# Patient Record
Sex: FEMALE | Race: WHITE | NOT HISPANIC OR LATINO | Employment: OTHER | URBAN - METROPOLITAN AREA
[De-identification: names, ages, dates, MRNs, and addresses within clinical notes are randomized per-mention and may not be internally consistent; named-entity substitution may affect disease eponyms.]

---

## 2017-08-08 ENCOUNTER — ALLSCRIPTS OFFICE VISIT (OUTPATIENT)
Dept: OTHER | Facility: OTHER | Age: 52
End: 2017-08-08

## 2017-08-08 DIAGNOSIS — Z12.31 ENCOUNTER FOR SCREENING MAMMOGRAM FOR MALIGNANT NEOPLASM OF BREAST: ICD-10-CM

## 2018-01-12 VITALS
HEIGHT: 63 IN | SYSTOLIC BLOOD PRESSURE: 110 MMHG | WEIGHT: 142 LBS | BODY MASS INDEX: 25.16 KG/M2 | DIASTOLIC BLOOD PRESSURE: 64 MMHG

## 2018-10-18 ENCOUNTER — ANNUAL EXAM (OUTPATIENT)
Dept: OBGYN CLINIC | Facility: CLINIC | Age: 53
End: 2018-10-18
Payer: COMMERCIAL

## 2018-10-18 VITALS
BODY MASS INDEX: 21.82 KG/M2 | WEIGHT: 139 LBS | HEIGHT: 67 IN | DIASTOLIC BLOOD PRESSURE: 74 MMHG | SYSTOLIC BLOOD PRESSURE: 116 MMHG

## 2018-10-18 DIAGNOSIS — Z01.419 ENCOUNTER FOR GYNECOLOGICAL EXAMINATION WITHOUT ABNORMAL FINDING: Primary | ICD-10-CM

## 2018-10-18 DIAGNOSIS — Z12.4 ENCOUNTER FOR PAPANICOLAOU SMEAR FOR CERVICAL CANCER SCREENING: ICD-10-CM

## 2018-10-18 DIAGNOSIS — Z12.12 SCREENING FOR COLORECTAL CANCER: ICD-10-CM

## 2018-10-18 DIAGNOSIS — Z12.11 SCREENING FOR COLORECTAL CANCER: ICD-10-CM

## 2018-10-18 DIAGNOSIS — Z12.31 ENCOUNTER FOR SCREENING MAMMOGRAM FOR MALIGNANT NEOPLASM OF BREAST: ICD-10-CM

## 2018-10-18 PROCEDURE — S0612 ANNUAL GYNECOLOGICAL EXAMINA: HCPCS | Performed by: NURSE PRACTITIONER

## 2018-10-18 RX ORDER — EZETIMIBE 10 MG/1
10 TABLET ORAL
COMMUNITY

## 2018-10-18 RX ORDER — DIPHENOXYLATE HYDROCHLORIDE AND ATROPINE SULFATE 2.5; .025 MG/1; MG/1
1 TABLET ORAL DAILY
COMMUNITY

## 2018-10-18 RX ORDER — ATORVASTATIN CALCIUM 40 MG/1
TABLET, FILM COATED ORAL
COMMUNITY
Start: 2015-06-22

## 2018-10-18 RX ORDER — ATORVASTATIN CALCIUM 40 MG/1
40 TABLET, FILM COATED ORAL DAILY
Refills: 0 | COMMUNITY
Start: 2018-10-04 | End: 2018-10-18

## 2018-10-18 RX ORDER — LORATADINE 10 MG/1
10 TABLET ORAL
COMMUNITY
End: 2019-05-30

## 2018-10-18 RX ORDER — FLUTICASONE PROPIONATE 50 MCG
2 SPRAY, SUSPENSION (ML) NASAL
COMMUNITY
End: 2019-08-27

## 2018-10-18 NOTE — PROGRESS NOTES
Assessment / Plan    1  Encounter for gynecological examination without abnormal finding  Normal well woman exam   Pap with hpv obtained  Counseled about risk of e-cig nicotine & marijuana use; not interested in quitting  2  Encounter for Papanicolaou smear for cervical cancer screening    3  Encounter for screening mammogram for malignant neoplasm of breast    - Mammo diagnostic bilateral w 3d & cad; Future    4  Screening for colorectal cancer  Referral provided    - Ambulatory referral to Gastroenterology; Future        Subjective      Ilya Luis is a 47 y o  female who presents for her annual gynecologic exam     Doing well, no complaints  Last pap:  negative  Last mammogram: 2015 [facility] mammo, benign  Colonoscopy- not yet    Quit cigarette use; now regularly smokes e-cigs  Not interested in quitting  Uses marijuana for anxiety  Periods: none; supracervical hysterectomy  Current contraception: status post hysterectomy  History of abnormal Pap smear: yes - in her 25s  Family history of breast,uterine, ovarian or colon cancer: no    Menstrual History:  OB History      Para Term  AB Living    3 1     2 1    SAB TAB Ectopic Multiple Live Births    1 1               Menarche age: 16  No LMP recorded  Patient has had a hysterectomy  The following portions of the patient's history were reviewed and updated as appropriate: allergies, current medications, past family history, past medical history, past social history, past surgical history and problem list     Review of Systems      Review of Systems   Constitutional: Negative for chills and fever  Gastrointestinal: Negative for abdominal distention, abdominal pain, blood in stool, constipation, diarrhea, nausea and vomiting  Genitourinary: Negative for difficulty urinating, dysuria, frequency, genital sores, hematuria, menstrual problem, pelvic pain, urgency, vaginal bleeding and vaginal discharge       Breasts:  Negative for skin changes, dimpling, asymmetry, nipple discharge, redness, tenderness or palpable masses      Objective      /74   Ht 5' 7" (1 702 m)   Wt 63 kg (139 lb)   BMI 21 77 kg/m²      Physical Exam   Constitutional: She appears well-developed and well-nourished  No distress  Neck: Neck supple  No thyromegaly present  Pulmonary/Chest: Right breast exhibits no inverted nipple, no mass, no nipple discharge, no skin change and no tenderness  Left breast exhibits no inverted nipple, no mass, no nipple discharge, no skin change and no tenderness  Breasts are symmetrical    Abdominal: Soft  Normal appearance  She exhibits no mass  There is no tenderness  There is no CVA tenderness  Genitourinary: Rectum normal  No labial fusion  There is no rash, tenderness, lesion or injury on the right labia  There is no rash, tenderness, lesion or injury on the left labia  Cervix exhibits no motion tenderness, no discharge and no friability  Right adnexum displays no mass, no tenderness and no fullness  Left adnexum displays no mass, no tenderness and no fullness  No erythema, tenderness or bleeding in the vagina  No foreign body in the vagina  No signs of injury around the vagina  No vaginal discharge found  Genitourinary Comments: Uterus surgically absent   Lymphadenopathy:     She has no cervical adenopathy  She has no axillary adenopathy  Right: No inguinal and no supraclavicular adenopathy present  Left: No inguinal and no supraclavicular adenopathy present  Neurological: She is alert  She is not disoriented  Skin: Skin is warm, dry and intact  Psychiatric: She has a normal mood and affect   Her behavior is normal

## 2018-10-23 LAB
CLINICAL INFO: ABNORMAL
CYTO CVX: ABNORMAL
DATE PREVIOUS BX: ABNORMAL
GEN CATEG CVX/VAG CYTO-IMP: ABNORMAL
HPV E6+E7 MRNA CVX QL NAA+PROBE: DETECTED
LMP START DATE: ABNORMAL
SL AMB PREV. PAP:: ABNORMAL
SPECIMEN SOURCE CVX/VAG CYTO: ABNORMAL

## 2018-10-24 ENCOUNTER — TELEPHONE (OUTPATIENT)
Dept: OBGYN CLINIC | Facility: CLINIC | Age: 53
End: 2018-10-24

## 2018-10-24 NOTE — TELEPHONE ENCOUNTER
Spoke to patient regarding her pap smear result which showed LGSIL  Explained nature of LGSIL and need for further evaluation of her cervix with colposcopy  Patient expressed understanding and she will call to book appointment

## 2018-10-30 ENCOUNTER — TELEPHONE (OUTPATIENT)
Dept: GASTROENTEROLOGY | Facility: AMBULARY SURGERY CENTER | Age: 53
End: 2018-10-30

## 2018-10-30 ENCOUNTER — PREP FOR PROCEDURE (OUTPATIENT)
Dept: GASTROENTEROLOGY | Facility: MEDICAL CENTER | Age: 53
End: 2018-10-30

## 2018-10-30 DIAGNOSIS — Z12.11 ENCOUNTER FOR SCREENING COLONOSCOPY: Primary | ICD-10-CM

## 2018-10-30 NOTE — TELEPHONE ENCOUNTER
Jonathan Richter  1965  [address] [facility] [address]  [phone]  Cell Phone     Screened by: Lori Quick  ]    Referring Dr :     Pre- Screening:   Has patient been referred for a routine screening Colonoscopy? yes  Is the patient over 48years of age? yes    If the answer is YES to both questions, proceed to the medical questions  Do you have any of the following symptoms? Have you had a coronary or vascular stent within the last year? no    Have you had a heart attack or stroke in the last 6 months? no    Have you had intestinal surgery in the last 3 months? no    Do you have problems with:    Do you use:  Oxygen no  CPAP/BiPAP no    Have you been hospitalized in the last Month? no    Have you been diagnosed with a bleeding disorder or anemia? no    Have you had chest pain (angina) or breathing problems  (COPD) in the last 3 months? no     Do you have any difficulty walking up a flight of stairs? no    Have you had Kidney failure or insufficiency? no    Have you had heart valve surgery? no    Are you confined to a wheelchair? no    Do you take     Do you take insulin for Diabetes no  Name of medication:    : If patient answers NO to medical questions, then schedule procedure  If patient answers YES to medical questions, then schedule office appointment  Previous Colonoscopy no   (if yes) Date and Facility of last colonoscopy? Patient scheduled for procedure:   Scheduled by:     Time:   Provider:   Location:     Insurance:   Referral Required? Were instructions Mailed? [facility]:   Was the prep sent to Pharmacy?      Comments: [ [place] ]

## 2018-10-30 NOTE — TELEPHONE ENCOUNTER
Pt is scheduled with dr Btei Lee for oa colon on 11/30/18, I went over miralax prep with pt and mailed out to pt home   Pt is aware she will get a call the day before with exact time of arrival

## 2018-10-30 NOTE — PATIENT INSTRUCTIONS
Pt is scheduled with dr Marifer Geiger for oa colon on 11/30/18, I went over miralax prep with pt and mailed out to pt home   Pt is aware she will get a call the day before with exact time of arrival

## 2018-11-01 ENCOUNTER — HOSPITAL ENCOUNTER (OUTPATIENT)
Dept: MAMMOGRAPHY | Facility: CLINIC | Age: 53
Discharge: HOME | End: 2018-11-01
Payer: COMMERCIAL

## 2018-11-01 DIAGNOSIS — Z12.31 ENCOUNTER FOR SCREENING MAMMOGRAM FOR MALIGNANT NEOPLASM OF BREAST: ICD-10-CM

## 2018-11-01 PROCEDURE — 77063 BREAST TOMOSYNTHESIS BI: CPT

## 2018-11-01 PROCEDURE — 77067 SCR MAMMO BI INCL CAD: CPT

## 2018-11-01 NOTE — LETTER
[address] [place] [address], [address] [place] [id number]      January 2, 2020    MRN: [id number]     Phone:[phone]     Dear Ms  Tres,    Based on your breast imaging exam performed on November 1, 2018 it is time to schedule your next screening breast imaging exam  Our records indicate that this examination has not yet been performed  Please contact your physician's office to obtain a prescription or referral for this exam     Screening mammography for early detection of cancer is important for your ongoing health  If you feel a lump or have any other reasons for concern, you should tell your health care provider  Early detection requires a combination of monthly breast self-awareness, yearly clinical breast examinations and periodic mammography according to your age, risk and physician recommendations  Yvonne  Scheduling department can assist you in making your next appointment by calling [phone]  Thank you for choosing [facility] for your imaging needs      Sincerely,    [address] [place] [address]

## 2018-11-20 ENCOUNTER — PROCEDURE VISIT (OUTPATIENT)
Dept: OBGYN CLINIC | Facility: CLINIC | Age: 53
End: 2018-11-20
Payer: COMMERCIAL

## 2018-11-20 VITALS
BODY MASS INDEX: 22.12 KG/M2 | SYSTOLIC BLOOD PRESSURE: 128 MMHG | DIASTOLIC BLOOD PRESSURE: 68 MMHG | WEIGHT: 141.2 LBS | HEART RATE: 63 BPM

## 2018-11-20 DIAGNOSIS — B97.7 HIGH RISK HPV INFECTION: ICD-10-CM

## 2018-11-20 DIAGNOSIS — R87.612 LGSIL ON PAP SMEAR OF CERVIX: Primary | ICD-10-CM

## 2018-11-20 PROCEDURE — 57454 BX/CURETT OF CERVIX W/SCOPE: CPT | Performed by: OBSTETRICS & GYNECOLOGY

## 2018-11-20 NOTE — PROGRESS NOTES
Colposcopy  Date/Time: 11/20/2018 9:24 AM  Performed by: Bj Noonan by: Clinton Soriano     Consent:     Consent obtained:  Written    Consent given by:  Patient    Procedural risks discussed:  Bleeding, failure rate, infection and possible continued pain    Patient questions answered: yes      Patient agrees, verbalizes understanding, and wants to proceed: yes      Educational handouts given: yes      Instructions and paperwork completed: yes    Pre-procedure:     Pre-procedure timeout performed: yes      Prepped with: acetic acid    Indication:     Indication:  LSIL (+HPV)  Procedure:     Procedure: Colposcopy w/ cervical biopsy and ECC      Under satisfactory analgesia the patient was prepped and draped in the dorsal lithotomy position: yes      Blanca speculum was placed in the vagina: yes      Under colposcopic examination the transition zone was seen in entirety: yes      Endocervix was curetted using a Kevorkian curette: yes      Cervical biopsy performed with a cervical biopsy punch: yes      Monsel's solution was applied: yes      Biopsy(s): yes      Location:  6 oclock, 9 oclock    Specimen to pathology: yes    Post-procedure:     Findings: Mosaicism and White epithelium      Impression: Low grade cervical dysplasia      Patient tolerance of procedure:   Tolerated well, no immediate complications  Comments:      Mosaicism 6 o clock, aceto white 9 oclock

## 2018-11-21 LAB
CLINICAL INFO: NORMAL
PATH REPORT.FINAL DX SPEC: NORMAL
PATH REPORT.FINAL DX SPEC: NORMAL
PROCEDURE TYPE: NORMAL
SPECIMEN SOURCE: NORMAL

## 2018-11-29 ENCOUNTER — ANESTHESIA EVENT (OUTPATIENT)
Dept: GASTROENTEROLOGY | Facility: MEDICAL CENTER | Age: 53
End: 2018-11-29
Payer: COMMERCIAL

## 2018-11-30 ENCOUNTER — HOSPITAL ENCOUNTER (OUTPATIENT)
Facility: MEDICAL CENTER | Age: 53
Setting detail: OUTPATIENT SURGERY
Discharge: HOME | End: 2018-11-30
Attending: INTERNAL MEDICINE | Admitting: INTERNAL MEDICINE
Payer: COMMERCIAL

## 2018-11-30 ENCOUNTER — ANESTHESIA (OUTPATIENT)
Dept: GASTROENTEROLOGY | Facility: MEDICAL CENTER | Age: 53
End: 2018-11-30
Payer: COMMERCIAL

## 2018-11-30 ENCOUNTER — TELEPHONE (OUTPATIENT)
Dept: OBGYN CLINIC | Facility: CLINIC | Age: 53
End: 2018-11-30

## 2018-11-30 VITALS
HEIGHT: 67 IN | HEART RATE: 61 BPM | WEIGHT: 141 LBS | DIASTOLIC BLOOD PRESSURE: 69 MMHG | OXYGEN SATURATION: 98 % | BODY MASS INDEX: 22.13 KG/M2 | TEMPERATURE: 97.8 F | SYSTOLIC BLOOD PRESSURE: 112 MMHG | RESPIRATION RATE: 16 BRPM

## 2018-11-30 DIAGNOSIS — Z12.11 ENCOUNTER FOR SCREENING COLONOSCOPY: ICD-10-CM

## 2018-11-30 PROCEDURE — 45385 COLONOSCOPY W/LESION REMOVAL: CPT | Performed by: INTERNAL MEDICINE

## 2018-11-30 PROCEDURE — 45380 COLONOSCOPY AND BIOPSY: CPT | Performed by: INTERNAL MEDICINE

## 2018-11-30 PROCEDURE — 88305 TISSUE EXAM BY PATHOLOGIST: CPT | Performed by: PATHOLOGY

## 2018-11-30 RX ORDER — PROPOFOL 10 MG/ML
INJECTION, EMULSION INTRAVENOUS AS NEEDED
Status: DISCONTINUED | OUTPATIENT
Start: 2018-11-30 | End: 2018-11-30 | Stop reason: SURG

## 2018-11-30 RX ORDER — SODIUM CHLORIDE 9 MG/ML
125 INJECTION, SOLUTION INTRAVENOUS CONTINUOUS
Status: DISCONTINUED | OUTPATIENT
Start: 2018-11-30 | End: 2018-11-30 | Stop reason: HOSPADM

## 2018-11-30 RX ADMIN — PROPOFOL 50 MG: 10 INJECTION, EMULSION INTRAVENOUS at 12:46

## 2018-11-30 RX ADMIN — PROPOFOL 50 MG: 10 INJECTION, EMULSION INTRAVENOUS at 12:43

## 2018-11-30 RX ADMIN — SODIUM CHLORIDE 125 ML/HR: 0.9 INJECTION, SOLUTION INTRAVENOUS at 12:32

## 2018-11-30 RX ADMIN — PROPOFOL 50 MG: 10 INJECTION, EMULSION INTRAVENOUS at 12:53

## 2018-11-30 RX ADMIN — PROPOFOL 100 MG: 10 INJECTION, EMULSION INTRAVENOUS at 12:38

## 2018-11-30 RX ADMIN — PROPOFOL 50 MG: 10 INJECTION, EMULSION INTRAVENOUS at 12:40

## 2018-11-30 NOTE — DISCHARGE INSTRUCTIONS
Colonoscopy   WHAT YOU NEED TO KNOW:   A colonoscopy is a procedure to examine the inside of your colon (intestine) with a scope  Polyps or tissue growths may have been removed during your colonoscopy  It is normal to feel bloated and to have some abdominal discomfort  You should be passing gas  If you have hemorrhoids or you had polyps removed, you may have a small amount of bleeding  DISCHARGE INSTRUCTIONS:   Seek care immediately if:   · You have a large amount of bright red blood in your bowel movements  · Your abdomen is hard and firm and you have severe pain  · You have sudden trouble breathing  Contact your healthcare provider if:   · You develop a rash or hives  · You have a fever within 24 hours of your procedure  · You have not had a bowel movement for 3 days after your procedure  · You have questions or concerns about your condition or care  Activity:   · Do not lift, strain, or run  for 3 days after your procedure  · Rest after your procedure  You have been given medicine to relax you  Do not  drive or make important decisions until the day after your procedure  Return to your normal activity as directed  · Relieve gas and discomfort from bloating  by lying on your right side with a heating pad on your abdomen  You may need to take short walks to help the gas move out  Eat small meals until bloating is relieved  If you had polyps removed: For 7 days after your procedure:  · Do not  take aspirin  · Do not  go on long car rides  Help prevent constipation:   · Eat a variety of healthy foods  Healthy foods include fruit, vegetables, whole-grain breads, low-fat dairy products, beans, lean meat, and fish  Ask if you need to be on a special diet  Your healthcare provider may recommend that you eat high-fiber foods such as cooked beans  Fiber helps you have regular bowel movements  · Drink liquids as directed    Adults should drink between 9 and 13 eight-ounce cups of liquid every day  Ask what amount is best for you  For most people, good liquids to drink are water, juice, and milk  · Exercise as directed  Talk to your healthcare provider about the best exercise plan for you  Exercise can help prevent constipation, decrease your blood pressure and improve your health  Follow up with your healthcare provider as directed:  Write down your questions so you remember to ask them during your visits  © 2017 [address] [employer] Information is for End User's use only and may not be sold, redistributed or otherwise used for commercial purposes  All illustrations and images included in CareNotes® are the copyrighted property of A D A M , Inc  or Rafael Castillo  The above information is an  only  It is not intended as medical advice for individual conditions or treatments  Talk to your doctor, nurse or pharmacist before following any medical regimen to see if it is safe and effective for you  Colorectal Polyps   WHAT YOU NEED TO KNOW:   Colorectal polyps are small growths of tissue in the lining of the colon and rectum  Most polyps are hyperplastic polyps and are usually benign (noncancerous)  Certain types of polyps, called adenomatous polyps, may turn into cancer  DISCHARGE INSTRUCTIONS:   Follow up with your healthcare provider or gastroenterologist as directed: You may need to return for more tests, such as another colonoscopy  Write down your questions so you remember to ask them during your visits  Reduce your risk for colorectal polyps:   · Eat a variety of healthy foods:  Healthy foods include fruit, vegetables, whole-grain breads, low-fat dairy products, beans, lean meat, and fish  Ask if you need to be on a special diet  · Maintain a healthy weight:  Ask your healthcare provider if you need to lose weight and how much you need to lose   Ask for help with a weight loss program     · Exercise:  Begin to exercise slowly and do more as you get stronger  Talk with your healthcare provider before you start an exercise program      · Limit alcohol:  Your risk for polyps increases the more you drink  · Do not smoke: If you smoke, it is never too late to quit  Ask for information about how to stop  For support and more information:   · Candido Torres ([facility])  [address] , [place] [address]  Phone: 6- 228 - 819-3757  Web Address: www digestive  niddk nih gov  Contact your healthcare provider or gastroenterologist if:   · You have a fever  · You have chills, a cough, or feel weak and achy  · You have abdominal pain that does not go away or gets worse after you take medicine  · Your abdomen is swollen  · You are losing weight without trying  · You have questions or concerns about your condition or care  Seek care immediately or call 911 if:   · You have sudden shortness of breath  · You have a fast heart rate, fast breathing, or are too dizzy to stand up  · You have severe abdominal pain  · You see blood in your bowel movement  © 2017 [address] [facility] Information is for End User's use only and may not be sold, redistributed or otherwise used for commercial purposes  All illustrations and images included in CareNotes® are the copyrighted property of A D A M , Inc  or Rafael Castillo  The above information is an  only  It is not intended as medical advice for individual conditions or treatments  Talk to your doctor, nurse or pharmacist before following any medical regimen to see if it is safe and effective for you  Hemorrhoids   WHAT YOU NEED TO KNOW:   What are hemorrhoids? Hemorrhoids are swollen blood vessels inside your rectum (internal hemorrhoids) or on your anus (external hemorrhoids)  Sometimes a hemorrhoid may prolapse  This means it extends out of your anus  What increases my risk for hemorrhoids? · Pregnancy or obesity    · Straining or sitting for a long time during bowel movements    · Liver disease    · Weak muscles around the anus caused by older age, rectal surgery, or anal intercourse    · A lack of physical activity    · Chronic diarrhea or constipation    · A low-fiber diet  What are the signs and symptoms of hemorrhoids? · Pain or itching around your anus or inside your rectum    · Swelling or bumps around your anus    · Bright red blood in your bowel movement, on the toilet paper, or in the toilet bowl    · Tissue bulging out of your anus (prolapsed hemorrhoids)    · Incontinence (poor control over urine or bowel movements)  How are hemorrhoids diagnosed? Your healthcare provider will ask about your symptoms, the foods you eat, and your bowel movements  He will examine your anus for external hemorrhoids  You may need the following:  · A digital rectal exam  is a test to check for hemorrhoids  Your healthcare provider will put a gloved finger inside your anus to feel for the hemorrhoids  · An anoscopy  is a test that uses a scope (small tube with a light and camera on the end) to look at your hemorrhoids  How are hemorrhoids treated? Treatment will depend on your symptoms  You may need any of the following:  · Medicines  can help decrease pain and swelling, and soften your bowel movement  The medicine may be a pill, pad, cream, or ointment  · Procedures  may be used to shrink or remove your hemorrhoid  Examples include rubber-band ligation, sclerotherapy, and photocoagulation  These procedures may be done in your healthcare provider's office  Ask your healthcare provider for more information about these procedures  · Surgery  may be needed to shrink or remove your hemorrhoids  How can I manage my symptoms? · Apply ice on your anus for 15 to 20 minutes every hour or as directed  Use an ice pack, or put crushed ice in a plastic bag   Cover it with a towel before you apply it to your anus  Ice helps prevent tissue damage and decreases swelling and pain  · Take a sitz bath  Fill a bathtub with 4 to 6 inches of warm water  You may also use a sitz bath pan that fits inside a toilet bowl  Sit in the sitz bath for 15 minutes  Do this 3 times a day, and after each bowel movement  The warm water can help decrease pain and swelling  · Keep your anal area clean  Gently wash the area with warm water daily  Soap may irritate the area  After a bowel movement, wipe with moist towelettes or wet toilet paper  Dry toilet paper can irritate the area  How can I help prevent hemorrhoids? · Do not strain to have a bowel movement  Do not sit on the toilet too long  These actions can increase pressure on the tissues in your rectum and anus  · Drink plenty of liquids  Liquids can help prevent constipation  Ask how much liquid to drink each day and which liquids are best for you  · Eat a variety of high-fiber foods  Examples include fruits, vegetables, and whole grains  Ask your healthcare provider how much fiber you need each day  You may need to take a fiber supplement  · Exercise as directed  Exercise, such as walking, may make it easier to have a bowel movement  Ask your healthcare provider to help you create an exercise plan  · Do not have anal sex  Anal sex can weaken the skin around your rectum and anus  · Avoid heavy lifting  This can cause straining and increase your risk for another hemorrhoid  When should I seek immediate care? · You have severe pain in your rectum or around your anus  · You have severe pain in your abdomen and you are vomiting  · You have bleeding from your anus that soaks through your underwear  When should I contact my healthcare provider? · You have frequent and painful bowel movements  · Your hemorrhoid looks or feels more swollen than usual      · You do not have a bowel movement for 2 days or more       · You see or feel tissue coming through your anus  · You have questions or concerns about your condition or care  CARE AGREEMENT:   You have the right to help plan your care  Learn about your health condition and how it may be treated  Discuss treatment options with your caregivers to decide what care you want to receive  You always have the right to refuse treatment  The above information is an  only  It is not intended as medical advice for individual conditions or treatments  Talk to your doctor, nurse or pharmacist before following any medical regimen to see if it is safe and effective for you  © 2017 [address] [employer]  Information is for End User's use only and may not be sold, redistributed or otherwise used for commercial purposes  All illustrations and images included in CareNotes® are the copyrighted property of A D A M , Inc  or Rafael Castillo

## 2018-11-30 NOTE — ANESTHESIA PREPROCEDURE EVALUATION
Review of Systems/Medical History          Cardiovascular  Hyperlipidemia,    Pulmonary  Negative pulmonary ROS        GI/Hepatic  Negative GI/hepatic ROS               Endo/Other  Negative endo/other ROS      GYN       Hematology  Negative hematology ROS      Musculoskeletal  Negative musculoskeletal ROS        Neurology  Negative neurology ROS      Psychology           Physical Exam    Airway    Mallampati score: I  TM Distance: >3 FB  Neck ROM: full     Dental   No notable dental hx     Cardiovascular  Rhythm: regular, Rate: normal, Cardiovascular exam normal    Pulmonary  Pulmonary exam normal     Other Findings        Anesthesia Plan  ASA Score- 2     Anesthesia Type- IV sedation with anesthesia with ASA Monitors  Additional Monitors:   Airway Plan:         Plan Factors-    Induction- intravenous  Postoperative Plan-     Informed Consent- Anesthetic plan and risks discussed with patient

## 2018-11-30 NOTE — DISCHARGE INSTR - AVS FIRST PAGE
OPERATIVE REPORT  PATIENT NAME: Louann Ferrera    :  1965  MRN: [id number]  Pt Location: [facility] GI ROOM 01    SURGERY DATE: 2018    Surgeon(s) and Role:     * Ryan Ackerman MD - Primary    Preop Diagnosis:  Encounter for screening colonoscopy [Z12 11]    Post-Op Diagnosis Codes:     * Encounter for screening colonoscopy [Z12 11]    Procedure(s) (LRB):  COLONOSCOPY (N/A)    Specimen(s):  ID Type Source Tests Collected by Time Destination   1 : cecal polyp- cold snare Tissue Polyp, Colorectal TISSUE Nellie Kelsey MD 2018 1250    2 : sigmoid polyp x4- forcep Tissue Polyp, Colorectal TISSUE EXAM Ryan Ackerman MD 2018 1255        Estimated Blood Loss:   Minimal    Colonoscopy Procedure Note    Procedure: Colonoscopy    Sedation: Monitored anesthesia care, check anesthesia records      ASA Class: 2    INDICATIONS:  Screening colonoscopy    POST-OP DIAGNOSIS: See the impression below    Procedure Details     Prior colonoscopy: No prior colonoscopy  Informed consent was obtained for the procedure, including sedation  Risks of perforation, hemorrhage, adverse drug reaction and aspiration were discussed  The patient was placed in the left lateral decubitus position  Based on the pre-procedure assessment, including review of the patient's medical history, medications, allergies, and review of systems, she had been deemed to be an appropriate candidate for conscious sedation; she was therefore sedated with the medications listed below  The patient was monitored continuously with telemetry, pulse oximetry, blood pressure monitoring, and direct observations  A rectal examination was performed  The colonoscope was inserted into the rectum and advanced under direct vision to the cecum, which was identified by the ileocecal valve and appendiceal orifice  The quality of the colonic preparation was good    A careful inspection was made as the colonoscope was withdrawn, including a retroflexed view of the rectum; findings and interventions are described below  Findings:  10 mm colonic polyp seen in the cecum removed by cold snare polypectomy  2-4 mm removed from sigmoid colon by cold biopsy polypectomy  4 mm colonic polyp seen in the rectosigmoid area removed by cold snare polypectomy  Diminutive internal hemorrhoids           Complications: None; patient tolerated the procedure well  Impression:    Multiple colonic polyps removed  Diminutive internal hemorrhoids    Recommendations:  Repeat colonoscopy in 3 years if polyp is an adenoma        SIGNATURE: Leona Love MD  DATE: November 30, 2018  TIME: 1:01 PM

## 2018-11-30 NOTE — H&P
History and Physical -  [facility]  Valentine Padilla 47 y o  female MRN: [id number]                  HPI: Valentine Padilla is a 47y o  year old female who presents for index colonoscopy evaluation  REVIEW OF SYSTEMS: Per the HPI, and otherwise unremarkable  Historical Information   Past Medical History:   Diagnosis Date    Hypercholesteremia      Past Surgical History:   Procedure Laterality Date    AUGMENTATION BREAST      FACELIFT  03/2018    HAND SURGERY      LAPAROSCOPIC SUPRACERVICAL HYSTERECTOMY      uterine fibroid    MAMMO STEREOTACTIC BREAST BIOPSY LEFT (ALL INC)  08/2014    fibroadenoma    MOUTH SURGERY      VAGINOPLASTY       Social History   History   Alcohol Use    Yes     Comment: 2-3 a week     History   Drug Use    Types: Marijuana     Comment: every other day     History   Smoking Status    Current Every Day Smoker    Types: E-Cigarettes   Smokeless Tobacco    Current User     Family History   Problem Relation Age of Onset    Deep vein thrombosis Mother     Hypertension Mother    [facility] Migraines Mother     Lung cancer Father     Hyperlipidemia Sister     Dementia Maternal Grandmother     Stroke Maternal Grandfather     Stroke Paternal Grandfather     Breast cancer Neg Hx     Colon cancer Neg Hx     Ovarian cancer Neg Hx     Uterine cancer Neg Hx        Meds/Allergies     Prescriptions Prior to Admission   Medication    atorvastatin (LIPITOR) 40 mg tablet    BIOTIN PO    cholecalciferol (VITAMIN D3) 1,000 units tablet    ESTROGENS CONJ SYNTHETIC B PO    ezetimibe (ZETIA) 10 mg tablet    fluticasone (FLONASE) 50 mcg/act nasal spray    loratadine (CLARITIN) 10 mg tablet    multivitamin (THERAGRAN) TABS       No Known Allergies    Objective     Blood pressure 123/67, pulse 60, temperature 97 8 °F (36 6 °C), temperature source Temporal, resp  rate 18, height 5' 7" (1 702 m), weight 64 kg (141 lb), SpO2 98 %        PHYSICAL EXAM    Gen: NAD  CV: RRR  CHEST: Clear  ABD: soft, NT/ND  EXT: no edema      ASSESSMENT/PLAN:  This is a 47y o  year old female here for colonoscopy, and she is stable and optimized for her procedure

## 2018-11-30 NOTE — TELEPHONE ENCOUNTER
Called patient to review pathology from Colposcopy  LMOM to call back for results  If patient calls, please notify her the results are benign  Recommend repeat 1 year pap smear

## 2018-11-30 NOTE — OP NOTE
OPERATIVE REPORT  PATIENT NAME: Louann Ferrera    :  1965  MRN: [id number]  Pt Location: [facility] GI ROOM 01    SURGERY DATE: 2018    Surgeon(s) and Role:     * Ryan Ackerman MD - Primary    Preop Diagnosis:  Encounter for screening colonoscopy [Z12 11]    Post-Op Diagnosis Codes:     * Encounter for screening colonoscopy [Z12 11]    Procedure(s) (LRB):  COLONOSCOPY (N/A)    Specimen(s):  ID Type Source Tests Collected by Time Destination   1 : cecal polyp- cold snare Tissue Polyp, Colorectal TISSUE Nellie Kelsey MD 2018 1250    2 : sigmoid polyp x4- forcep Tissue Polyp, Colorectal TISSUE EXAM Ryan Ackerman MD 2018 1255        Estimated Blood Loss:   Minimal    Colonoscopy Procedure Note    Procedure: Colonoscopy    Sedation: Monitored anesthesia care, check anesthesia records      ASA Class: 2    INDICATIONS:  Screening colonoscopy    POST-OP DIAGNOSIS: See the impression below    Procedure Details     Prior colonoscopy: No prior colonoscopy  Informed consent was obtained for the procedure, including sedation  Risks of perforation, hemorrhage, adverse drug reaction and aspiration were discussed  The patient was placed in the left lateral decubitus position  Based on the pre-procedure assessment, including review of the patient's medical history, medications, allergies, and review of systems, she had been deemed to be an appropriate candidate for conscious sedation; she was therefore sedated with the medications listed below  The patient was monitored continuously with telemetry, pulse oximetry, blood pressure monitoring, and direct observations  A rectal examination was performed  The colonoscope was inserted into the rectum and advanced under direct vision to the cecum, which was identified by the ileocecal valve and appendiceal orifice  The quality of the colonic preparation was good    A careful inspection was made as the colonoscope was withdrawn, including a retroflexed view of the rectum; findings and interventions are described below  Findings:  10 mm colonic polyp seen in the cecum removed by cold snare polypectomy  2-4 mm removed from sigmoid colon by cold biopsy polypectomy  4 mm colonic polyp seen in the rectosigmoid area removed by cold snare polypectomy  Diminutive internal hemorrhoids           Complications: None; patient tolerated the procedure well  Impression:    Multiple colonic polyps removed  Diminutive internal hemorrhoids    Recommendations:  Repeat colonoscopy in 3 years if polyp is an adenoma        SIGNATURE: Janet Lao MD  DATE: November 30, 2018  TIME: 1:01 PM

## 2019-05-30 ENCOUNTER — HOSPITAL ENCOUNTER (EMERGENCY)
Facility: HOSPITAL | Age: 54
Discharge: HOME | End: 2019-05-30
Attending: EMERGENCY MEDICINE | Admitting: EMERGENCY MEDICINE
Payer: COMMERCIAL

## 2019-05-30 ENCOUNTER — APPOINTMENT (EMERGENCY)
Dept: CT IMAGING | Facility: HOSPITAL | Age: 54
End: 2019-05-30
Payer: COMMERCIAL

## 2019-05-30 VITALS
WEIGHT: 136.47 LBS | DIASTOLIC BLOOD PRESSURE: 77 MMHG | RESPIRATION RATE: 18 BRPM | HEART RATE: 53 BPM | TEMPERATURE: 98.4 F | BODY MASS INDEX: 21.37 KG/M2 | OXYGEN SATURATION: 100 % | SYSTOLIC BLOOD PRESSURE: 152 MMHG

## 2019-05-30 DIAGNOSIS — R51.9 HEADACHE: Primary | ICD-10-CM

## 2019-05-30 LAB
ALBUMIN SERPL BCP-MCNC: 3.6 G/DL (ref 3.5–5)
ALP SERPL-CCNC: 64 U/L (ref 46–116)
ALT SERPL W P-5'-P-CCNC: 45 U/L (ref 12–78)
ANION GAP SERPL CALCULATED.3IONS-SCNC: 9 MMOL/L (ref 4–13)
AST SERPL W P-5'-P-CCNC: 14 U/L (ref 5–45)
BASOPHILS # BLD AUTO: 0.02 THOUSANDS/ΜL (ref 0–0.1)
BASOPHILS NFR BLD AUTO: 0 % (ref 0–1)
BILIRUB SERPL-MCNC: 0.67 MG/DL (ref 0.2–1)
BILIRUB UR QL STRIP: NEGATIVE
BUN SERPL-MCNC: 11 MG/DL (ref 5–25)
CALCIUM SERPL-MCNC: 9.5 MG/DL (ref 8.3–10.1)
CHLORIDE SERPL-SCNC: 100 MMOL/L (ref 100–108)
CLARITY UR: ABNORMAL
CO2 SERPL-SCNC: 27 MMOL/L (ref 21–32)
COLOR UR: YELLOW
CREAT SERPL-MCNC: 0.83 MG/DL (ref 0.6–1.3)
EOSINOPHIL # BLD AUTO: 0.02 THOUSAND/ΜL (ref 0–0.61)
EOSINOPHIL NFR BLD AUTO: 0 % (ref 0–6)
ERYTHROCYTE [DISTWIDTH] IN BLOOD BY AUTOMATED COUNT: 12.1 % (ref 11.6–15.1)
GFR SERPL CREATININE-BSD FRML MDRD: 81 ML/MIN/1.73SQ M
GLUCOSE SERPL-MCNC: 100 MG/DL (ref 65–140)
GLUCOSE UR STRIP-MCNC: NEGATIVE MG/DL
HCT VFR BLD AUTO: 42.9 % (ref 34.8–46.1)
HGB BLD-MCNC: 14.9 G/DL (ref 11.5–15.4)
HGB UR QL STRIP.AUTO: NEGATIVE
IMM GRANULOCYTES # BLD AUTO: 0.02 THOUSAND/UL (ref 0–0.2)
IMM GRANULOCYTES NFR BLD AUTO: 0 % (ref 0–2)
KETONES UR STRIP-MCNC: ABNORMAL MG/DL
LEUKOCYTE ESTERASE UR QL STRIP: NEGATIVE
LYMPHOCYTES # BLD AUTO: 1.38 THOUSANDS/ΜL (ref 0.6–4.47)
LYMPHOCYTES NFR BLD AUTO: 19 % (ref 14–44)
MCH RBC QN AUTO: 31.5 PG (ref 26.8–34.3)
MCHC RBC AUTO-ENTMCNC: 34.7 G/DL (ref 31.4–37.4)
MCV RBC AUTO: 91 FL (ref 82–98)
MONOCYTES # BLD AUTO: 0.37 THOUSAND/ΜL (ref 0.17–1.22)
MONOCYTES NFR BLD AUTO: 5 % (ref 4–12)
NEUTROPHILS # BLD AUTO: 5.41 THOUSANDS/ΜL (ref 1.85–7.62)
NEUTS SEG NFR BLD AUTO: 76 % (ref 43–75)
NITRITE UR QL STRIP: NEGATIVE
NRBC BLD AUTO-RTO: 0 /100 WBCS
PH UR STRIP.AUTO: 7 [PH] (ref 4.5–8)
PLATELET # BLD AUTO: 362 THOUSANDS/UL (ref 149–390)
PMV BLD AUTO: 8.3 FL (ref 8.9–12.7)
POTASSIUM SERPL-SCNC: 3.6 MMOL/L (ref 3.5–5.3)
PROT SERPL-MCNC: 7 G/DL (ref 6.4–8.2)
PROT UR STRIP-MCNC: NEGATIVE MG/DL
RBC # BLD AUTO: 4.73 MILLION/UL (ref 3.81–5.12)
SODIUM SERPL-SCNC: 136 MMOL/L (ref 136–145)
SP GR UR STRIP.AUTO: 1.01 (ref 1–1.03)
UROBILINOGEN UR QL STRIP.AUTO: 1 E.U./DL
WBC # BLD AUTO: 7.22 THOUSAND/UL (ref 4.31–10.16)

## 2019-05-30 PROCEDURE — 96361 HYDRATE IV INFUSION ADD-ON: CPT

## 2019-05-30 PROCEDURE — 96375 TX/PRO/DX INJ NEW DRUG ADDON: CPT

## 2019-05-30 PROCEDURE — 99284 EMERGENCY DEPT VISIT MOD MDM: CPT | Performed by: PHYSICIAN ASSISTANT

## 2019-05-30 PROCEDURE — 99284 EMERGENCY DEPT VISIT MOD MDM: CPT

## 2019-05-30 PROCEDURE — 96374 THER/PROPH/DIAG INJ IV PUSH: CPT

## 2019-05-30 PROCEDURE — 36415 COLL VENOUS BLD VENIPUNCTURE: CPT | Performed by: PHYSICIAN ASSISTANT

## 2019-05-30 PROCEDURE — 70450 CT HEAD/BRAIN W/O DYE: CPT

## 2019-05-30 PROCEDURE — 80053 COMPREHEN METABOLIC PANEL: CPT | Performed by: PHYSICIAN ASSISTANT

## 2019-05-30 PROCEDURE — 81003 URINALYSIS AUTO W/O SCOPE: CPT

## 2019-05-30 PROCEDURE — 85025 COMPLETE CBC W/AUTO DIFF WBC: CPT | Performed by: PHYSICIAN ASSISTANT

## 2019-05-30 RX ORDER — DEXAMETHASONE SODIUM PHOSPHATE 4 MG/ML
10 INJECTION, SOLUTION INTRA-ARTICULAR; INTRALESIONAL; INTRAMUSCULAR; INTRAVENOUS; SOFT TISSUE ONCE
Status: COMPLETED | OUTPATIENT
Start: 2019-05-30 | End: 2019-05-30

## 2019-05-30 RX ORDER — KETOROLAC TROMETHAMINE 30 MG/ML
15 INJECTION, SOLUTION INTRAMUSCULAR; INTRAVENOUS ONCE
Status: COMPLETED | OUTPATIENT
Start: 2019-05-30 | End: 2019-05-30

## 2019-05-30 RX ORDER — BUTALBITAL, ACETAMINOPHEN AND CAFFEINE 50; 325; 40 MG/1; MG/1; MG/1
1 TABLET ORAL ONCE
Status: COMPLETED | OUTPATIENT
Start: 2019-05-30 | End: 2019-05-30

## 2019-05-30 RX ORDER — ONDANSETRON 2 MG/ML
4 INJECTION INTRAMUSCULAR; INTRAVENOUS ONCE
Status: COMPLETED | OUTPATIENT
Start: 2019-05-30 | End: 2019-05-30

## 2019-05-30 RX ADMIN — KETOROLAC TROMETHAMINE 15 MG: 30 INJECTION, SOLUTION INTRAMUSCULAR; INTRAVENOUS at 12:14

## 2019-05-30 RX ADMIN — DEXAMETHASONE SODIUM PHOSPHATE 10 MG: 4 INJECTION, SOLUTION INTRAMUSCULAR; INTRAVENOUS at 13:24

## 2019-05-30 RX ADMIN — BUTALBITAL, ACETAMINOPHEN AND CAFFEINE 1 TABLET: 50; 325; 40 TABLET ORAL at 13:40

## 2019-05-30 RX ADMIN — ONDANSETRON 4 MG: 2 INJECTION INTRAMUSCULAR; INTRAVENOUS at 12:14

## 2019-05-30 RX ADMIN — SODIUM CHLORIDE 1000 ML: 0.9 INJECTION, SOLUTION INTRAVENOUS at 13:23

## 2019-05-30 RX ADMIN — SODIUM CHLORIDE 1000 ML: 0.9 INJECTION, SOLUTION INTRAVENOUS at 12:13

## 2019-08-27 ENCOUNTER — HOSPITAL ENCOUNTER (EMERGENCY)
Facility: HOSPITAL | Age: 54
Discharge: HOME | End: 2019-08-27
Attending: EMERGENCY MEDICINE | Admitting: EMERGENCY MEDICINE
Payer: COMMERCIAL

## 2019-08-27 VITALS
SYSTOLIC BLOOD PRESSURE: 144 MMHG | DIASTOLIC BLOOD PRESSURE: 82 MMHG | RESPIRATION RATE: 18 BRPM | HEART RATE: 86 BPM | OXYGEN SATURATION: 98 % | TEMPERATURE: 97.7 F | WEIGHT: 155.42 LBS | BODY MASS INDEX: 24.34 KG/M2

## 2019-08-27 DIAGNOSIS — T14.8XXA HEMATOMA: Primary | ICD-10-CM

## 2019-08-27 DIAGNOSIS — M79.89 LEFT LEG SWELLING: ICD-10-CM

## 2019-08-27 LAB — DEPRECATED D DIMER PPP: 343 NG/ML (FEU)

## 2019-08-27 PROCEDURE — 85379 FIBRIN DEGRADATION QUANT: CPT | Performed by: EMERGENCY MEDICINE

## 2019-08-27 PROCEDURE — 99283 EMERGENCY DEPT VISIT LOW MDM: CPT

## 2019-08-27 PROCEDURE — 99283 EMERGENCY DEPT VISIT LOW MDM: CPT | Performed by: EMERGENCY MEDICINE

## 2019-08-27 PROCEDURE — 36415 COLL VENOUS BLD VENIPUNCTURE: CPT | Performed by: EMERGENCY MEDICINE

## 2019-08-27 NOTE — ED NOTES
PMS intact prior to and following ACE wrap application          [address], RN  08/27/19 2258

## 2019-08-27 NOTE — ED PROVIDER NOTES
History  Chief Complaint   Patient presents with    Leg Pain     Pt reports getting thrown off horse on 8/13 and horse's belly landed on left leg  Pt then reports pain worsening on 8/22 after plane ride to [place]  Pt returns from [place] today  Pt describes pain as "cramping " leg swollen     50 yo F presents to ED for lump on leg  Pt says on 8/13, a horse tripped and landed on her L leg  She had a bruise just above her L ankle but was walking ok  Pt says on Thursday, she noticed a lump on her L lower leg above where the bruise was  Since then, when she stands or walks, her L lower leg becomes swollen, which is relieved by elevating her leg  She thinks the lump has been unchanged in size since she first noticed it  The same day that she noticed the lump, she got on a plane to go to [place]  Today she returned from [place] and came straight to the ED after getting off the plane  Pt says her lower leg isn't swollen now because she has been elevating it on the plane and has been getting around on a wheelchair to avoid walking on it  No other DVT/PE risk factors except pt does take estrogen supplementation  No chest pain or shortness of breath  Prior to Admission Medications   Prescriptions Last Dose Informant Patient Reported? Taking?    BIOTIN PO   Yes Yes   Sig: Take by mouth daily   Cholecalciferol (VITAMIN D PO)   Yes Yes   Sig: Take by mouth    ESTROGENS CONJ SYNTHETIC B PO   Yes Yes   Sig: Take 1 tablet by mouth daily   atorvastatin (LIPITOR) 40 mg tablet   Yes Yes   ezetimibe (ZETIA) 10 mg tablet   Yes Yes   Sig: Take 10 mg by mouth   multivitamin (THERAGRAN) TABS   Yes Yes   Sig: Take 1 tablet by mouth daily      Facility-Administered Medications: None       Past Medical History:   Diagnosis Date    Abnormal Pap smear of cervix     11/2018; LGSIL +HRHPV; 11/2018-colpo--wnl; 11/2019-pap    Hypercholesteremia        Past Surgical History:   Procedure Laterality Date    AUGMENTATION BREAST      COLONOSCOPY N/A 11/30/2018    Procedure: COLONOSCOPY;  Surgeon: Lucero Delaney MD;  Location: [facility] GI LAB; Service: Gastroenterology    FACELIFT  03/2018    HAND SURGERY      LAPAROSCOPIC SUPRACERVICAL HYSTERECTOMY      uterine fibroid    MAMMO STEREOTACTIC BREAST BIOPSY LEFT (ALL INC)  08/2014    fibroadenoma    MOUTH SURGERY      VAGINOPLASTY         Family History   Problem Relation Age of Onset    Deep vein thrombosis Mother     Hypertension Mother    Amelia Hernandez Migraines Mother     Lung cancer Father     Hyperlipidemia Sister     Dementia Maternal Grandmother     Stroke Maternal Grandfather     Stroke Paternal Grandfather     Breast cancer Neg Hx     Colon cancer Neg Hx     Ovarian cancer Neg Hx     Uterine cancer Neg Hx      I have reviewed and agree with the history as documented  Social History     Tobacco Use    Smoking status: Current Every Day Smoker     Types: E-Cigarettes    Smokeless tobacco: Never Used   Substance Use Topics    Alcohol use: Yes     Comment: 2-3 a week    Drug use: Not Currently     Types: Marijuana        Review of Systems   Constitutional: Negative for activity change, chills and fever  HENT: Negative for congestion, rhinorrhea, sore throat and trouble swallowing  Eyes: Negative for pain, discharge and visual disturbance  Respiratory: Negative for cough, chest tightness and shortness of breath  Cardiovascular: Positive for leg swelling  Negative for chest pain and palpitations  Gastrointestinal: Negative for abdominal pain, nausea and vomiting  Genitourinary: Negative for dysuria, frequency and urgency  Musculoskeletal: Negative for gait problem, neck pain and neck stiffness  Skin: Negative for pallor, rash and wound  Neurological: Negative for dizziness, syncope, light-headedness and headaches         Physical Exam  ED Triage Vitals   Temperature Pulse Respirations Blood Pressure SpO2   08/27/19 0011 08/27/19 0009 08/27/19 0009 08/27/19 0009 08/27/19 0009   97 7 °F (36 5 °C) 86 18 144/82 98 %      Temp Source Heart Rate Source Patient Position - Orthostatic VS BP Location FiO2 (%)   08/27/19 0011 08/27/19 0009 08/27/19 0009 08/27/19 0009 --   Oral Monitor Lying Right arm       Pain Score       08/27/19 0009       4             Orthostatic Vital Signs  Vitals:    08/27/19 0009   BP: 144/82   Pulse: 86   Patient Position - Orthostatic VS: Lying       Physical Exam   Constitutional: She is oriented to person, place, and time  She appears well-developed and well-nourished  No distress  HENT:   Head: Normocephalic and atraumatic  Mouth/Throat: Oropharynx is clear and moist    Eyes: Conjunctivae and EOM are normal  Right eye exhibits no discharge  Left eye exhibits no discharge  Neck: Normal range of motion  Neck supple  nontender   Cardiovascular: Normal rate, regular rhythm and intact distal pulses  Normal DP and PT pulses bilaterally   Pulmonary/Chest: Effort normal and breath sounds normal  No respiratory distress  She exhibits no tenderness  Abdominal: Soft  There is no tenderness  There is no rebound and no guarding  Musculoskeletal: Normal range of motion  She exhibits edema (L lower leg non pitting, mild)  She exhibits no deformity  Neurological: She is alert and oriented to person, place, and time  No sensory deficit  She exhibits normal muscle tone  Skin: Skin is warm and dry  Capillary refill takes less than 2 seconds  approx 3 cm diameter circular firm mass to L medial mid calf area  Tender to palpation  No erythema  No induration  Nursing note and vitals reviewed        ED Medications  Medications - No data to display    Diagnostic Studies  Results Reviewed     Procedure Component Value Units Date/Time    D-dimer, quantitative [[id number]]  (Normal) Collected:  08/27/19 0040    Lab Status:  Final result Specimen:  Blood from Arm, Right Updated:  08/27/19 0057     D-Dimer, Quant 343 ng/ml (FEU)                  No orders to display         Procedures  Procedures        ED Course                               [facility]  Number of Diagnoses or Management Options  Hematoma:   Left leg swelling:   Diagnosis management comments: Low likelihood of DVT with negative d-dimer  Mass on L leg likely hematoma causing dependent edema  Elevate, ice, compression  Ace wrap applied  Return precautions discussed  Follow up with pcp in 3 days if not improving  Disposition  Final diagnoses:   Hematoma   Left leg swelling     Time reflects when diagnosis was documented in both MDM as applicable and the Disposition within this note     Time User Action Codes Description Comment    8/27/2019  1:37 AM Emily Pour  8XXA] Hematoma     8/27/2019  1:37 AM Jose Carlos Bates Add [M79 89] Left leg swelling       ED Disposition     ED Disposition Condition Date/Time Comment    Discharge Stable Tue Aug 27, 2019  1:37 AM Lita Bryant discharge to home/self care              Follow-up Information     Follow up With Specialties Details Why [address] [facility] Emergency Medicine  As needed, If symptoms worsen [facility] [id number] [phone] [facility], [address], [place], Rosa Isela Freeman [address], DO Family Medicine In 3 days As needed [address]  [phone]             Discharge Medication List as of 8/27/2019  1:37 AM      CONTINUE these medications which have NOT CHANGED    Details   atorvastatin (LIPITOR) 40 mg tablet Starting Mon 6/22/2015, Historical Med      BIOTIN PO Take by mouth daily, Historical Med      Cholecalciferol (VITAMIN D PO) Take by mouth , Historical Med      ESTROGENS CONJ SYNTHETIC B PO Take 1 tablet by mouth daily, Historical Med      ezetimibe (ZETIA) 10 mg tablet Take 10 mg by mouth, Historical Med      multivitamin (THERAGRAN) TABS Take 1 tablet by mouth daily, Historical Med           No discharge procedures on file  ED Provider  Attending physically available and evaluated Guadlupe Persons  I managed the patient along with the ED Attending      Electronically Signed by         Timmy Ramirez MD  08/27/19 9391

## 2019-08-27 NOTE — ED ATTENDING ATTESTATION
Kay RASCON, saw and evaluated the patient  I have discussed the patient with the resident/non-physician practitioner and agree with the resident's/non-physician practitioner's findings, Plan of Care, and MDM as documented in the resident's/non-physician practitioner's note, except where noted  All available labs and Radiology studies were reviewed  I was present for key portions of any procedure(s) performed by the resident/non-physician practitioner and I was immediately available to provide assistance  At this point I agree with the current assessment done in the Emergency Department  I have conducted an independent evaluation of this patient a history and physical is as follows:    A 25-year-old female with past medical history of hypercholesterolemia; presents with swelling and pain to her left leg  Patient states on 8/13 she was horseback riding, when she fell and the horse landed on her left lower leg  Patient states she did sustain bruising immediately however minimal swelling  Patient states since 8/22 she has noticed a lump to the inner aspect of the left leg, along with pain and swelling  Patient states the pain extends from the calf down towards the ankle and foot  Patient states swelling is worse with ambulation, and improves when elevated  Patient did also recently travel to and from [place]  Patient otherwise denies fever, chills, chest pain, shortness of breath, abdominal pain, nausea, vomiting, diarrhea and rashes      Physical Exam  General Appearance: alert and oriented, nad, non toxic appearing  Skin:  Warm, dry, intact  HEENT: atraumatic, normocephalic  Neck: Supple, trachea midline  Cardiac: RRR; no murmurs, rub, gallops  Pulmonary: lungs CTAB; no wheezes, rales, rhonchi  Gastrointestinal: abdomen soft, nontender, nondistended; no guarding or rebound tenderness; good bowel sounds, no mass or bruits  Extremities:  3 cm palpable mass to medial aspect of proximal left calf, area is tender to palpation  Remainder of left lower extremity nontender  Mild nonpitting edema to left lower leg  Faint area of ecchymosis to distal aspect of medial left lower leg  2+ pulses; no calf tenderness, no clubbing, no cyanosis  Neuro:  no focal motor or sensory deficits, CN 2-12 grossly intact  Psych:  Normal mood and affect, normal judgement and insight    Assessment and Plan:  Left leg pain and swelling, palpable mass appreciated to the medial aspect of the proximal left calf  Mild nonpitting edema appreciated to the left lower leg  Suspect masses secondary to hematoma from recent trauma, however due to swelling will obtain D-dimer to rule out possible DVT      Critical Care Time  Procedures

## 2019-10-25 ENCOUNTER — ANNUAL EXAM (OUTPATIENT)
Dept: OBGYN CLINIC | Facility: CLINIC | Age: 54
End: 2019-10-25
Payer: COMMERCIAL

## 2019-10-25 VITALS
DIASTOLIC BLOOD PRESSURE: 72 MMHG | SYSTOLIC BLOOD PRESSURE: 116 MMHG | OXYGEN SATURATION: 97 % | WEIGHT: 147.2 LBS | BODY MASS INDEX: 25.13 KG/M2 | HEART RATE: 85 BPM | HEIGHT: 64 IN

## 2019-10-25 DIAGNOSIS — Z11.3 SCREENING EXAMINATION FOR STD (SEXUALLY TRANSMITTED DISEASE): ICD-10-CM

## 2019-10-25 DIAGNOSIS — R87.612 LGSIL ON PAP SMEAR OF CERVIX: ICD-10-CM

## 2019-10-25 DIAGNOSIS — B97.7 HIGH RISK HPV INFECTION: ICD-10-CM

## 2019-10-25 DIAGNOSIS — Z01.411 ENCOUNTER FOR GYNECOLOGICAL EXAMINATION WITH ABNORMAL FINDING: Primary | ICD-10-CM

## 2019-10-25 DIAGNOSIS — Z12.31 ENCOUNTER FOR SCREENING MAMMOGRAM FOR BREAST CANCER: ICD-10-CM

## 2019-10-25 PROCEDURE — 88141 CYTOPATH C/V INTERPRET: CPT | Performed by: PATHOLOGY

## 2019-10-25 PROCEDURE — 87491 CHLMYD TRACH DNA AMP PROBE: CPT | Performed by: NURSE PRACTITIONER

## 2019-10-25 PROCEDURE — G0145 SCR C/V CYTO,THINLAYER,RESCR: HCPCS | Performed by: PATHOLOGY

## 2019-10-25 PROCEDURE — S0612 ANNUAL GYNECOLOGICAL EXAMINA: HCPCS | Performed by: NURSE PRACTITIONER

## 2019-10-25 PROCEDURE — 87591 N.GONORRHOEAE DNA AMP PROB: CPT | Performed by: NURSE PRACTITIONER

## 2019-10-25 PROCEDURE — 87624 HPV HI-RISK TYP POOLED RSLT: CPT | Performed by: NURSE PRACTITIONER

## 2019-10-25 RX ORDER — LORAZEPAM 0.5 MG/1
TABLET ORAL
COMMUNITY
Start: 2019-10-22

## 2019-10-25 RX ORDER — RIZATRIPTAN BENZOATE 10 MG/1
TABLET ORAL
COMMUNITY
Start: 2019-10-14

## 2019-10-25 NOTE — PROGRESS NOTES
Assessment / Plan    1  Encounter for gynecological examination with abnormal finding  Through [address] [place] [address] [address] in [facility]-- yearly labwork & compounded cream estrogen/ progesterone and testosterone  Agrees to screening for GC/chlamydia  Encouraged condom use / safe sex    2  LGSIL on Pap smear of cervix  2018, colpo path negative  Repeat pap with cotesting obtained    3  High risk HPV infection      4  Encounter for screening mammogram for breast cancer  Order provided  5   Screening for STDs      Subjective      Vito Mcknight is a 49 y o  female who presents for her annual gynecologic exam     Doing well, no complaints  Last pap: 2018 LGSIL; colpo negative  Last mammogram: 2018, 3D benign  Colonoscopy, 2018- multiple colonic polyps, rpt 3 years    Supracervical hyst, benign, uterine fibroid  [facility] in [facility]-- prescribes her compounded est/prog/testo cream    Periods are absent  Current contraception: status post hysterectomy  History of abnormal Pap smear: yes -   Family history of breast,uterine, ovarian or colon cancer: no  Sexually active, new partner, not using condoms  Discovered that her  was having sex with prostitutes  Had serum STD testing ordered by her PCP  Menstrual History:  OB History        3    Para   1    Term                AB   2    Living   1       SAB   1    TAB   1    Ectopic        Multiple        Live Births                    Menarche age: 16  No LMP recorded (lmp unknown)  Patient has had a hysterectomy  The following portions of the patient's history were reviewed and updated as appropriate: allergies, current medications, past family history, past medical history, past social history, past surgical history and problem list     Review of Systems      Review of Systems   Constitutional: Negative for chills and fever     Gastrointestinal: Negative for abdominal distention, abdominal pain, blood in stool, constipation, diarrhea, nausea and vomiting  Genitourinary: Negative for difficulty urinating, dysuria, frequency, genital sores, hematuria, menstrual problem, pelvic pain, urgency, vaginal bleeding and vaginal discharge  Breasts:  Negative for skin changes, dimpling, asymmetry, nipple discharge, redness, tenderness or palpable masses    Objective      /72 (BP Location: Left arm, Patient Position: Sitting, Cuff Size: Standard)   Pulse 85   Ht 5' 4" (1 626 m)   Wt 66 8 kg (147 lb 3 2 oz)   LMP  (LMP Unknown)   SpO2 97%   Breastfeeding? No   BMI 25 27 kg/m²      Physical Exam   Constitutional: She is oriented to person, place, and time  She appears well-developed and well-nourished  No distress  HENT:   Head: Normocephalic and atraumatic  Eyes: Pupils are equal, round, and reactive to light  Neck: Neck supple  No thyromegaly present  Pulmonary/Chest: Effort normal  Right breast exhibits no inverted nipple, no mass, no nipple discharge, no skin change and no tenderness  Left breast exhibits no inverted nipple, no mass, no nipple discharge, no skin change and no tenderness  Breasts are symmetrical    Bilateral implants   Abdominal: Soft  Normal appearance  She exhibits no mass  There is no tenderness  There is no CVA tenderness  Genitourinary: Rectum normal  Pelvic exam was performed with patient supine  No labial fusion  There is no rash, tenderness, lesion or injury on the right labia  There is no rash, tenderness, lesion or injury on the left labia  Cervix exhibits no motion tenderness, no discharge and no friability  Right adnexum displays no mass, no tenderness and no fullness  Left adnexum displays no mass, no tenderness and no fullness  No erythema, tenderness or bleeding in the vagina  No foreign body in the vagina  No signs of injury around the vagina  No vaginal discharge found  Genitourinary Comments: Uterus absent; supracervical   Lymphadenopathy:     She has no cervical adenopathy       She has no axillary adenopathy  Right: No inguinal and no supraclavicular adenopathy present  Left: No inguinal and no supraclavicular adenopathy present  Neurological: She is alert and oriented to person, place, and time  She is not disoriented  Skin: Skin is warm, dry and intact  Psychiatric: She has a normal mood and affect   Her behavior is normal  Thought content normal

## 2019-10-28 LAB
HPV HR 12 DNA CVX QL NAA+PROBE: NEGATIVE
HPV16 DNA CVX QL NAA+PROBE: NEGATIVE
HPV18 DNA CVX QL NAA+PROBE: NEGATIVE

## 2019-10-29 LAB
C TRACH DNA SPEC QL NAA+PROBE: NEGATIVE
N GONORRHOEA DNA SPEC QL NAA+PROBE: NEGATIVE

## 2019-11-01 LAB
LAB AP GYN PRIMARY INTERPRETATION: ABNORMAL
Lab: ABNORMAL
PATH INTERP SPEC-IMP: ABNORMAL

## 2019-11-12 ENCOUNTER — TELEPHONE (OUTPATIENT)
Dept: OBGYN CLINIC | Facility: CLINIC | Age: 54
End: 2019-11-12

## 2019-11-12 PROBLEM — R87.619 ABNORMAL PAP SMEAR OF CERVIX: Status: ACTIVE | Noted: 2019-11-12

## 2019-11-12 NOTE — TELEPHONE ENCOUNTER
Left message on patient's voice mail regarding her pap smear result which showed LGSIL/ neg HPV  Advised that we recommend repeat pap with cotesting in one year

## 2021-01-08 ENCOUNTER — TRANSCRIBE ORDERS (OUTPATIENT)
Dept: ADMINISTRATIVE | Facility: HOSPITAL | Age: 56
End: 2021-01-08

## 2021-01-08 DIAGNOSIS — M25.551 RIGHT HIP PAIN: Primary | ICD-10-CM

## 2021-01-11 ENCOUNTER — OFFICE VISIT (OUTPATIENT)
Dept: OBGYN CLINIC | Facility: CLINIC | Age: 56
End: 2021-01-11

## 2021-01-11 VITALS
SYSTOLIC BLOOD PRESSURE: 114 MMHG | HEIGHT: 64 IN | DIASTOLIC BLOOD PRESSURE: 76 MMHG | WEIGHT: 153.2 LBS | BODY MASS INDEX: 26.15 KG/M2

## 2021-01-11 DIAGNOSIS — N89.8 VAGINAL DISCHARGE: ICD-10-CM

## 2021-01-11 DIAGNOSIS — B96.89 BACTERIAL VAGINOSIS: Primary | ICD-10-CM

## 2021-01-11 DIAGNOSIS — N76.0 BACTERIAL VAGINOSIS: Primary | ICD-10-CM

## 2021-01-11 LAB
BV WHIFF TEST VAG QL: POSITIVE
CLUE CELLS SPEC QL WET PREP: PRESENT
PH SMN: ABNORMAL [PH]
SL AMB POCT WET MOUNT: ABNORMAL
T VAGINALIS VAG QL WET PREP: NEGATIVE
YEAST VAG QL WET PREP: NEGATIVE

## 2021-01-11 PROCEDURE — 99213 OFFICE O/P EST LOW 20 MIN: CPT | Performed by: NURSE PRACTITIONER

## 2021-01-11 PROCEDURE — 87210 SMEAR WET MOUNT SALINE/INK: CPT | Performed by: NURSE PRACTITIONER

## 2021-01-11 RX ORDER — METRONIDAZOLE 500 MG/1
500 TABLET ORAL EVERY 12 HOURS SCHEDULED
Qty: 14 TABLET | Refills: 0 | Status: SHIPPED | OUTPATIENT
Start: 2021-01-11 | End: 2021-01-18

## 2021-01-11 NOTE — PROGRESS NOTES
Assessment/Plan:    1  Bacterial vaginosis  metroNIDAZOLE (FLAGYL) 500 mg tablet   2  Vaginal discharge  POCT wet mount   Wet mount positive for BV  Discussed nature of BV with patient and preventative strategies  Rx sent for oral metronidazole  RV for yearly and pap  Subjective:      Patient ID: Geralyn Gowers is a 48 y o  female  HPI  PROBLEM VISIT  CC: vulvovaginal symptoms    47 yo  present with c/o vaginal symptoms which started approximately one month ago  Started with vaginal discharge with a fishy smell  She used probiotics and homeopathic inserts  The symptoms improved, but not entirely  The symptoms then began to worsen again and she used a Monistat 1 about one which ago which caused burning  Although her symptoms are better today she would like to be checked to make sure  She is going through a divorce and temporarily is without HC coverage, has to pay out of pocket for today's visit  The following portions of the patient's history were reviewed and updated as appropriate: allergies, current medications, past family history, past medical history, past social history, past surgical history and problem list     Review of Systems   Constitutional: Negative for chills and fever  Respiratory: Negative for cough and shortness of breath  Genitourinary: Negative for difficulty urinating, dysuria, frequency, genital sores, pelvic pain, urgency, vaginal bleeding and vaginal discharge  Musculoskeletal: Negative for arthralgias and myalgias  Objective:      /76 (BP Location: Left arm, Patient Position: Sitting, Cuff Size: Adult)   Ht 5' 4" (1 626 m)   Wt 69 5 kg (153 lb 3 2 oz)   LMP  (LMP Unknown)   BMI 26 30 kg/m²     Wet mount: + clue cells     Physical Exam  Constitutional:       General: She is not in acute distress  Appearance: Normal appearance  She is well-developed and normal weight  She is not ill-appearing or diaphoretic     HENT:      Head: Normocephalic and atraumatic  Eyes:      Pupils: Pupils are equal, round, and reactive to light  Pulmonary:      Effort: Pulmonary effort is normal    Genitourinary:     General: Normal vulva  Exam position: Lithotomy position  Labia:         Right: No rash, tenderness, lesion or injury  Left: No rash, tenderness, lesion or injury  Vagina: No signs of injury and foreign body  Vaginal discharge (creamy) present  No erythema, tenderness or bleeding  Cervix: No cervical motion tenderness, discharge or friability  Uterus: Not enlarged and not tender  Adnexa:         Right: No mass or tenderness  Left: No mass or tenderness  Skin:     General: Skin is warm and dry  Neurological:      General: No focal deficit present  Mental Status: She is alert and oriented to person, place, and time  Psychiatric:         Mood and Affect: Mood normal          Behavior: Behavior normal          Thought Content:  Thought content normal          Judgment: Judgment normal

## 2021-01-25 ENCOUNTER — APPOINTMENT (OUTPATIENT)
Dept: RADIOLOGY | Facility: MEDICAL CENTER | Age: 56
End: 2021-01-25
Payer: COMMERCIAL

## 2021-01-25 DIAGNOSIS — M25.551 RIGHT HIP PAIN: ICD-10-CM

## 2021-01-25 PROCEDURE — 73502 X-RAY EXAM HIP UNI 2-3 VIEWS: CPT

## 2021-03-09 ENCOUNTER — APPOINTMENT (OUTPATIENT)
Dept: RADIOLOGY | Facility: MEDICAL CENTER | Age: 56
End: 2021-03-09
Payer: COMMERCIAL

## 2021-03-09 ENCOUNTER — OFFICE VISIT (OUTPATIENT)
Dept: OBGYN CLINIC | Facility: MEDICAL CENTER | Age: 56
End: 2021-03-09
Payer: COMMERCIAL

## 2021-03-09 VITALS
DIASTOLIC BLOOD PRESSURE: 91 MMHG | BODY MASS INDEX: 26.12 KG/M2 | WEIGHT: 153 LBS | HEART RATE: 75 BPM | HEIGHT: 64 IN | SYSTOLIC BLOOD PRESSURE: 137 MMHG

## 2021-03-09 DIAGNOSIS — M25.559 HIP PAIN: ICD-10-CM

## 2021-03-09 DIAGNOSIS — M51.36 DDD (DEGENERATIVE DISC DISEASE), LUMBAR: Primary | ICD-10-CM

## 2021-03-09 PROCEDURE — 99203 OFFICE O/P NEW LOW 30 MIN: CPT | Performed by: ORTHOPAEDIC SURGERY

## 2021-03-09 PROCEDURE — 72100 X-RAY EXAM L-S SPINE 2/3 VWS: CPT

## 2021-03-09 NOTE — PROGRESS NOTES
Orthopaedic Surgery - Office Note  Geralyn Gowers (89 y o  female)   : 1965   MRN: [id number]  Encounter Date: 3/9/2021    Chief Complaint   Patient presents with    Right Hip - Pain       Assessment / Plan  Right radicular hip pain referred from lumbar DDD    · Activity as tolerated  · Begin outpatient PT for lumbar DDD specific modalities (Core strengthening, ROM, etc )  · Anti-inflammatories or Tylenol prn pain  Return in about 7 weeks (around 2021)  History of Present Illness  Geralyn Gowers is a 48 y o  female who presents for new evaluation of right hip pain  Pain has joo insidious in onset over the past year  Pain seems to eminate from the back and radiate down the side of the hip  It is worse with prolonged sitting or excessive activity and improves with rest  She did get some initial improvement from chiropractic adjustment, but then the pain returned  No formal PT or injections as of yet  Denies weakness, bowel or bladder dysfunction  Review of Systems  Pertinent items are noted in HPI  All other systems were reviewed and are negative  Physical Exam  /91   Pulse 75   Ht 5' 4" (1 626 m)   Wt 69 4 kg (153 lb)   LMP  (LMP Unknown)   BMI 26 26 kg/m²   Cons: Appears well  No apparent distress  Psych: Alert  Oriented x3  Mood and affect normal   Eyes: PERRLA, EOMI  Resp: Normal effort  No audible wheezing or stridor  CV: Palpable pulse  No discernable arrhythmia  No LE edema  Lymph:  No palpable cervical, axillary, or inguinal lymphadenopathy  Skin: Warm  No palpable masses  No visible lesions  Neuro: Normal muscle tone  Normal and symmetric DTR's  Lumbar Spine Exam  Alignment:  Normal lumbar alignment  Inspection:  No swelling  No edema  No ecchymosis  No deformity  Palpation:  Mild perispinal tenderness  ROM:  Normal lumbar ROM  Normal hip ROM  Strength:  5/5 hip flexors and abductors  5/5 quadriceps and hamstrings    Tests:  (-) Straight leg raise bilaterally  (-) LESLIE, (-) BARB, (-) Kalin   Neurovascular:  Sensation intact in DP/SP/Cote/Sa/T nerve distributions  Toes warm and perfused  Gait:  Normal     Studies Reviewed  XR of right hip - Minimal degenerative changes   XR of Lumbar spine- Degenerative lumbar scoliosis with disc space narrowing and foramenal stenosis as several levels     Procedures  No procedures today  Medical, Surgical, Family, and Social History  The patient's medical history, family history, and social history, were reviewed and updated as appropriate  Past Medical History:   Diagnosis Date    Abnormal Pap smear of cervix     11/2018; LGSIL +HRHPV; 11/2018-colpo--wnl; 11/2019-pap    Anxiety and depression     Depression in 2015, anxiety prior     Fibroadenoma of left breast     Hypercholesteremia     Polycythemia     BMB benign     Varicella without complication        Past Surgical History:   Procedure Laterality Date    AUGMENTATION BREAST      BLEPHAROPLASTY      Ptosis     BONE MARROW BIOPSY  05/2014    Benign     COLONOSCOPY N/A 11/30/2018    Procedure: COLONOSCOPY;  Surgeon: Daniele Thompson MD;  Location: [facility];   Service: Gastroenterology    DILATION AND CURETTAGE OF UTERUS      SAB 45 yo    FACELIFT  03/2018    HAND SURGERY      LAPAROSCOPIC SUPRACERVICAL HYSTERECTOMY      uterine fibroid    MAMMO STEREOTACTIC BREAST BIOPSY LEFT (ALL INC)  08/2014    fibroadenoma    MOUTH SURGERY      VAGINOPLASTY         Family History   Problem Relation Age of Onset    Deep vein thrombosis Mother     Hypertension Mother    Willena Rand Migraines Mother     Lung cancer Father     Hyperlipidemia Sister     Dementia Maternal Grandmother     Stroke Maternal Grandfather     Stroke Paternal Grandfather     Breast cancer Neg Hx     Colon cancer Neg Hx     Ovarian cancer Neg Hx     Uterine cancer Neg Hx        Social History     Occupational History    Not on file   Tobacco Use    Smoking status: Current Every Day Smoker     Types: E-Cigarettes    Smokeless tobacco: Never Used   Substance and Sexual Activity    Alcohol use: Yes     Comment: 2-3 a week    Drug use: Not Currently     Types: Marijuana    Sexual activity: Yes     Partners: Male     Birth control/protection: Surgical       No Known Allergies      Current Outpatient Medications:     atorvastatin (LIPITOR) 40 mg tablet, , Disp: , Rfl:     BIOTIN PO, Take by mouth daily, Disp: , Rfl:     Cholecalciferol (VITAMIN D PO), Take by mouth , Disp: , Rfl:     ESTROGENS CONJ SYNTHETIC B PO, Take 1 tablet by mouth daily, Disp: , Rfl:     ezetimibe (ZETIA) 10 mg tablet, Take 10 mg by mouth, Disp: , Rfl:     LORazepam (ATIVAN) 0 5 mg tablet, , Disp: , Rfl:     multivitamin (THERAGRAN) TABS, Take 1 tablet by mouth daily, Disp: , Rfl:     rizatriptan (MAXALT) 10 MG tablet, , Disp: , Rfl:       Precious Scott MD    Scribe Attestation    I,:   am acting as a scribe while in the presence of the attending physician :       I,:   personally performed the services described in this documentation    as scribed in my presence :

## 2021-03-18 ENCOUNTER — EVALUATION (OUTPATIENT)
Dept: PHYSICAL THERAPY | Facility: CLINIC | Age: 56
End: 2021-03-18
Payer: COMMERCIAL

## 2021-03-18 DIAGNOSIS — M51.36 DDD (DEGENERATIVE DISC DISEASE), LUMBAR: Primary | ICD-10-CM

## 2021-03-18 PROCEDURE — 97161 PT EVAL LOW COMPLEX 20 MIN: CPT | Performed by: PHYSICAL THERAPIST

## 2021-03-18 PROCEDURE — 97112 NEUROMUSCULAR REEDUCATION: CPT | Performed by: PHYSICAL THERAPIST

## 2021-03-18 PROCEDURE — 97110 THERAPEUTIC EXERCISES: CPT | Performed by: PHYSICAL THERAPIST

## 2021-03-18 NOTE — PROGRESS NOTES
PT Evaluation     Today's date: 3/18/2021  Patient name: Sang Sandoval  : 1965  MRN: [id number]  Referring provider: Phoenix Mtz MD  Dx:   Encounter Diagnosis     ICD-10-CM    1  DDD (degenerative disc disease), lumbar  M51 36                   Assessment  Assessment details: Pt is a 48y o  year old female presenting to physical therapy for DDD (degenerative disc disease), lumbar  She presents today with the following impairments: hypertonic R paraspinals and erector spinae, and hypotonic L paraspinals and erector spinae, as well as poor multifidi on the L, as well as some minor ROM limitations and LE weakness affecting their function with walking, exercising, vacuuming, pilates, yoga, cleaning, lifting, and carrying  Pt will benefit from skilled physical therapy to address functional limitations noted in evaluation and meet patient goals  Impairments: abnormal coordination, abnormal muscle firing, abnormal muscle tone, abnormal or restricted ROM, abnormal movement, activity intolerance, impaired physical strength, lacks appropriate home exercise program, pain with function and poor body mechanics    Symptom irritability: moderateUnderstanding of Dx/Px/POC: good   Prognosis: good    Goals  ST  Pt will reduce pain to 0/10 at rest   2  Pt will demonstrate improved TA activation w activity    LT  Pt will improve plank to 2+ minutes w/o pain  2  Pt will improve L multifidi activation to good  3   Pt will be I w HEP    Plan  Patient would benefit from: PT eval and skilled physical therapy  Planned modality interventions: biofeedback, electrical stimulation/Russian stimulation, TENS and thermotherapy: hydrocollator packs  Planned therapy interventions: abdominal trunk stabilization, manual therapy, joint mobilization, neuromuscular re-education, patient education, strengthening, stretching, therapeutic activities, therapeutic exercise, flexibility, functional ROM exercises and home exercise program  Frequency: 1x week  Duration in weeks: 6  Treatment plan discussed with: patient        Subjective Evaluation    History of Present Illness  Mechanism of injury: Pt reports R hip and low back pain that has worsened over the past year and limits her exercising 3x a week  She also notes tightness in her hamstrings and low back, and reports occasional numbness and tingling in R toe  She also notes falling off horse 2 years ago that may have started her pain, but she injured her leg more in the fall  She reports some weight change, but is related to activity levels during pandemic  Denies night pain or sensory changes  Pain  Current pain ratin  At worst pain ratin  Quality: dull ache      Diagnostic Tests  X-ray: normal  Treatments  Current treatment: physical therapy  Patient Goals  Patient goals for therapy: decreased pain, increased motion, increased strength and return to sport/leisure activities  Patient goal: exercising        Objective     Palpation   Left   Hypertonic in the erector spinae and lumbar paraspinals  Tenderness of the erector spinae, lumbar paraspinals and quadratus lumborum  Right   No palpable tenderness to the quadratus lumborum  Hypotonic in the erector spinae, lumbar paraspinals and quadratus lumborum       Active Range of Motion     Lumbar   Flexion:  WFL  Extension:  WFL and with pain  Left lateral flexion:  WFL  Right lateral flexion:  Restriction level: minimal  Left rotation:  WFL  Right rotation:  [facility]    Joint Play   Joints within functional limits: T8, T9, T10, T11, T12, L1, L2, L3, L4, L5 and S1     Pain: L4 and L5     Strength/Myotome Testing     Lumbar   Left   Heel walk: normal  Toe walk: normal    Right   Heel walk: normal  Toe walk: normal    Left Hip   Planes of Motion   Flexion: 4  Adduction: 4    Right Hip   Planes of Motion   Flexion: 4-  Adduction: 4-    Left Knee   Flexion: 4  Extension: 4+    Right Knee   Flexion: 4-  Extension: 4+    Additional Strength Details  Fair squat mechanics with no pain present  Good plank mechanics w slight dip on R hip w fatigue  Muscle Activation   Patient able to activate left transverse abdominals, right transverse abdominals and right multifidus  Patient unable to activate left multifidus  Additional Muscle Activation Details  Fair TA activation  Poor multifidi activation on R      Tests     Lumbar     Left   Negative passive SLR and slump test      Right   Negative passive SLR and slump test        Flowsheet Rows      Most Recent Value   PT/OT G-Codes   Current Score  59   Projected Score  68             Precautions: DDD lumbar    Date 3/18            Visit # 1            FOTO 59/68             Re-eval IE              Manuals 3/18            L Paraspinal STM SF                                                   Neuro Re-Ed 3/18            TA Bracing 3x10"            Bridge w SLR 10x ea            Bird Dog 10x            Multifidi Lift             SB Omi-Knife             Pallof Press             UTR             Curl Up                          Ther Ex 3/18            Bike             Plank 30"            Side Plank 30" ea                                                                Pt Edu SF            Ther Activity                                       Gait Training                                       Modalities

## 2021-03-25 ENCOUNTER — OFFICE VISIT (OUTPATIENT)
Dept: PHYSICAL THERAPY | Facility: CLINIC | Age: 56
End: 2021-03-25
Payer: COMMERCIAL

## 2021-03-25 DIAGNOSIS — M51.36 DDD (DEGENERATIVE DISC DISEASE), LUMBAR: Primary | ICD-10-CM

## 2021-03-25 PROCEDURE — 97110 THERAPEUTIC EXERCISES: CPT | Performed by: PHYSICAL THERAPIST

## 2021-03-25 NOTE — PROGRESS NOTES
Daily Note     Today's date: 3/25/2021  Patient name: Christiano Ignacio  : 1965  MRN: [id number]  Referring provider: Doris Lane MD  Dx:   Encounter Diagnosis     ICD-10-CM    1  DDD (degenerative disc disease), lumbar  M51 36                   Subjective: Pt reports her back is doing better today and she is sore after doing Pilates the other day  Objective: See treatment diary below      Assessment: Pt is challenged w progression of core stability exercises, but does well w functional lifts including squats, lunges, and SL RDLs, although she requires cueing for form throughout  She is challenged w resisted UTR and SB ernesto-knives  Patient demonstrated fatigue post treatment and would benefit from continued PT  Plan: Continue per plan of care  Progress treatment as tolerated         Precautions: DDD lumbar    Date 3/18 3/25           Visit # 1 2           FOTO              Re-eval IE              Manuals 3/18 3/25           L Paraspinal STM SF                                                   Neuro Re-Ed 3/18 3/25           TA Bracing 3x10"            Bridge w SLR 10x ea 15x ea on SB           Bird Dog 10x            Multifidi Lift             SB Ernesto-Knife  10x           Pallof Press             UTR  15x  ea 8#           Curl Up  2x15            SB Ham Curl  2x10                        Ther Ex 3/18 3/25           Bike  5'           Plank 30"            Side Plank 30" ea                                                                Pt Edu SF SF           Ther Activity             Squat  2x10 18# KB           Sl RDLs  15x ea w stick           Lunges  15x ea                                     Gait Training                                       Modalities

## 2021-04-01 ENCOUNTER — APPOINTMENT (OUTPATIENT)
Dept: PHYSICAL THERAPY | Facility: CLINIC | Age: 56
End: 2021-04-01
Payer: COMMERCIAL

## 2021-04-01 ENCOUNTER — OFFICE VISIT (OUTPATIENT)
Dept: PHYSICAL THERAPY | Facility: CLINIC | Age: 56
End: 2021-04-01
Payer: COMMERCIAL

## 2021-04-01 DIAGNOSIS — M51.36 DDD (DEGENERATIVE DISC DISEASE), LUMBAR: Primary | ICD-10-CM

## 2021-04-01 PROCEDURE — 97110 THERAPEUTIC EXERCISES: CPT | Performed by: PHYSICAL THERAPIST

## 2021-04-01 NOTE — PROGRESS NOTES
Daily Note     Today's date: 2021  Patient name: Rodolfo Jacobs  : 1965  MRN: [id number]  Referring provider: Flor Vela MD  Dx:   Encounter Diagnosis     ICD-10-CM    1  DDD (degenerative disc disease), lumbar  M51 36                   Subjective: Pt reports her back is feeling sore but is having less pain  She inquired about returning to horseback riding  Objective: See treatment diary below      Assessment: Pt does well w progression of core stability training and is challenged w addition of lateral walking and leg press  She has some b/l knee pain w squatting when she narrows her base of support, but pain improves w medial glide to patella  Patient demonstrated fatigue post treatment and would benefit from continued PT      Plan: Continue per plan of care  Progress treatment as tolerated         Precautions: DDD lumbar    Date 3/18 3/25 4/1          Visit # 1 2 3          FOTO              Re-eval IE              Manuals 3/18 3/25 4/1          L Paraspinal STM SF                                                   Neuro Re-Ed 3/18 3/25 4/1          TA Bracing 3x10"            Bridge w SLR 10x ea 15x ea on SB 15x ea on SB          Bird Dog 10x  15x BTB          Multifidi Lift             SB Omi-Knife  10x           Pallof Press             UTR  15x  ea 8# 10x 10#          Curl Up  2x15  2x10 BMB          SB Ham Curl  2x10 2x10          Lat Walking   5x10'  Blue X band                       Ther Ex 3/18 3/25 4/1          Bike  5' 5'          Plank 30"            Side Plank 30" ea  2x30"          Leg Press   SL 2x15 75#                                                 Pt Edu SF SF           Ther Activity             Squat  2x10 18# KB 2x10 26# KB          Sl RDLs  15x ea w stick 15x ea w 9# KB          Lunges  15x ea                                     Gait Training                                       Modalities

## 2021-04-08 ENCOUNTER — OFFICE VISIT (OUTPATIENT)
Dept: PHYSICAL THERAPY | Facility: CLINIC | Age: 56
End: 2021-04-08
Payer: COMMERCIAL

## 2021-04-08 ENCOUNTER — APPOINTMENT (OUTPATIENT)
Dept: PHYSICAL THERAPY | Facility: CLINIC | Age: 56
End: 2021-04-08
Payer: COMMERCIAL

## 2021-04-08 DIAGNOSIS — M51.36 DDD (DEGENERATIVE DISC DISEASE), LUMBAR: Primary | ICD-10-CM

## 2021-04-08 PROCEDURE — 97110 THERAPEUTIC EXERCISES: CPT | Performed by: PHYSICAL THERAPIST

## 2021-04-08 NOTE — PROGRESS NOTES
Daily Note     Today's date: 2021  Patient name: Louann Ferrera  : 1965  MRN: [id number]  Referring provider: David Bernal MD  Dx:   Encounter Diagnosis     ICD-10-CM    1  DDD (degenerative disc disease), lumbar  M51 36                   Subjective: Pt reports a constant back ache across her low back, but feels better with exercise  Objective: See treatment diary below      Assessment: Pt does well w continued progression of core and functional activities  She has improved core control w SB ernesto-knives and bridges on SB  She does well w side planks w clams  Patient demonstrated fatigue post treatment and would benefit from continued PT  Plan: Continue per plan of care  Progress treatment as tolerated         Precautions: DDD lumbar    Date 3/18 3/25 4/1 4/8         Visit # 1 2 3 4         FOTO              Re-eval IE              Manuals 3/18 3/25 4/1 4/8         L Paraspinal STM SF                                                   Neuro Re-Ed 3/18 3/25 4/1          TA Bracing 3x10"            Bridge w SLR 10x ea 15x ea on SB 15x ea on SB 2x10 SB         Bird Dog 10x  15x BTB          Multifidi Lift             SB Ernesto-Knife  10x  10x         Pallof Press             UTR  15x  ea 8# 10x 10#          Curl Up  2x15  2x10 BMB          SB Ham Curl  2x10 2x10 2x10         Lat Walking   5x10'  Blue X band 5x10'  Blue X band                      Ther Ex 3/18 3/25 4/1          Bike  5' 5' 5'         Plank 30"            Side Plank 30" ea  2x30" 2x10 on knees w clam RTB         Leg Press   SL 2x15 75#                                                 Pt Edu SF SF           Ther Activity             Squat  2x10 18# KB 2x10 26# KB 2x10 SL squat on hi-lo         Sl RDLs  15x ea w stick 15x ea w 9# KB 15x ea w 18# KB         Lunges  15x ea  10x w BMB and trunk rot                                   Gait Training                                       Modalities

## 2021-04-15 ENCOUNTER — APPOINTMENT (OUTPATIENT)
Dept: PHYSICAL THERAPY | Facility: CLINIC | Age: 56
End: 2021-04-15
Payer: COMMERCIAL

## 2021-04-27 ENCOUNTER — OFFICE VISIT (OUTPATIENT)
Dept: OBGYN CLINIC | Facility: MEDICAL CENTER | Age: 56
End: 2021-04-27
Payer: COMMERCIAL

## 2021-04-27 VITALS
BODY MASS INDEX: 26.12 KG/M2 | DIASTOLIC BLOOD PRESSURE: 86 MMHG | SYSTOLIC BLOOD PRESSURE: 130 MMHG | WEIGHT: 153 LBS | HEART RATE: 82 BPM | HEIGHT: 64 IN

## 2021-04-27 DIAGNOSIS — M51.36 DDD (DEGENERATIVE DISC DISEASE), LUMBAR: ICD-10-CM

## 2021-04-27 DIAGNOSIS — M25.559 HIP PAIN: Primary | ICD-10-CM

## 2021-04-27 PROCEDURE — 99213 OFFICE O/P EST LOW 20 MIN: CPT | Performed by: ORTHOPAEDIC SURGERY

## 2021-04-27 NOTE — PROGRESS NOTES
Orthopaedic Surgery - Office Note  Genesis Osborn (48 y o  female)   : 1965   MRN: [id number]  Encounter Date: 2021    Chief Complaint   Patient presents with    Right Hip - Follow-up       Assessment / Plan  Right radicular hip pain referred from lumbar DDD    · Continue outpatient PT, continue with current treatment plan  New script given today in office  · Continue activity as tolerated  · Continue anti-inflammatories PRN pain  Return if symptoms worsen or fail to improve  History of Present Illness  Genesis Osborn is a 48 y o  female who presents for follow up right radicular hip pain referred from lumbar DDD  Pain has been insidious in onset over the past year  Since her prior visit, she has been attending PT which she feels is significantly improving her pain  She states her hip pain has largely resolved and when she does experience discomfort in her low back  This discomfort increases with prolonged sitting and laying  She uses Tylenol and Advil for pain management  She denies any radiating symptoms  Review of Systems  Pertinent items are noted in HPI  All other systems were reviewed and are negative  Physical Exam  /86   Pulse 82   Ht 5' 4" (1 626 m)   Wt 69 4 kg (153 lb)   LMP  (LMP Unknown)   BMI 26 26 kg/m²   Cons: Appears well  No apparent distress  Psych: Alert  Oriented x3  Mood and affect normal   Eyes: PERRLA, EOMI  Resp: Normal effort  No audible wheezing or stridor  CV: Palpable pulse  No discernable arrhythmia  No LE edema  Lymph:  No palpable cervical, axillary, or inguinal lymphadenopathy  Skin: Warm  No palpable masses  No visible lesions  Neuro: Normal muscle tone  Normal and symmetric DTR's  Right Hip Exam  Alignment / Posture:  Normal resting hip posture  Inspection:  No swelling  No erythema  No ecchymosis  Palpation:  No tenderness at lateral hip  mild perispinal tenderness  No effusion  ROM:  Normal hip ROM    Strength:  5/5 hip flexors and abductors  Stability:  No objective hip instability  Tests:  No pertinent positive or negative tests  Neurovascular:  Sensation intact in DP/SP/Cote/Sa/T nerve distributions  2+ DP & PT pulses  Gait:  Normal     Studies Reviewed  I have personally reviewed pertinent films in PACS  XR of right hip - images from 1/25/2021 which demonstrate  minimal degenerative changes   XR of Lumbar spine- images from 3/09/2021 which demonstrate degenerative lumbar scoliosis with disc space narrowing and foramenal stenosis as several levels     Procedures  No procedures today  Medical, Surgical, Family, and Social History  The patient's medical history, family history, and social history, were reviewed and updated as appropriate  Past Medical History:   Diagnosis Date    Abnormal Pap smear of cervix     11/2018; LGSIL +HRHPV; 11/2018-colpo--wnl; 11/2019-pap    Anxiety and depression     Depression in 2015, anxiety prior     Fibroadenoma of left breast     Hypercholesteremia     Polycythemia     BMB benign     Varicella without complication        Past Surgical History:   Procedure Laterality Date    AUGMENTATION BREAST      BLEPHAROPLASTY      Ptosis     BONE MARROW BIOPSY  05/2014    Benign     COLONOSCOPY N/A 11/30/2018    Procedure: COLONOSCOPY;  Surgeon: Love Velez MD;  Location: [facility];   Service: Gastroenterology    DILATION AND CURETTAGE OF UTERUS      SAB 47 yo    FACELIFT  03/2018    HAND SURGERY      LAPAROSCOPIC SUPRACERVICAL HYSTERECTOMY      uterine fibroid    MAMMO STEREOTACTIC BREAST BIOPSY LEFT (ALL INC)  08/2014    fibroadenoma    MOUTH SURGERY      VAGINOPLASTY         Family History   Problem Relation Age of Onset    Deep vein thrombosis Mother     Hypertension Mother    Delcie Dejon Migraines Mother     Lung cancer Father     Hyperlipidemia Sister     Dementia Maternal Grandmother     Stroke Maternal Grandfather     Stroke Paternal Grandfather     Breast cancer Neg Hx     Colon cancer Neg Hx     Ovarian cancer Neg Hx     Uterine cancer Neg Hx        Social History     Occupational History    Not on file   Tobacco Use    Smoking status: Current Every Day Smoker     Types: E-Cigarettes    Smokeless tobacco: Never Used   Substance and Sexual Activity    Alcohol use: Yes     Comment: 2-3 a week    Drug use: Not Currently     Types: Marijuana    Sexual activity: Yes     Partners: Male     Birth control/protection: Surgical       No Known Allergies      Current Outpatient Medications:     atorvastatin (LIPITOR) 40 mg tablet, , Disp: , Rfl:     BIOTIN PO, Take by mouth daily, Disp: , Rfl:     Cholecalciferol (VITAMIN D PO), Take by mouth , Disp: , Rfl:     ESTROGENS CONJ SYNTHETIC B PO, Take 1 tablet by mouth daily, Disp: , Rfl:     ezetimibe (ZETIA) 10 mg tablet, Take 10 mg by mouth, Disp: , Rfl:     LORazepam (ATIVAN) 0 5 mg tablet, , Disp: , Rfl:     multivitamin (THERAGRAN) TABS, Take 1 tablet by mouth daily, Disp: , Rfl:     rizatriptan (MAXALT) 10 MG tablet, , Disp: , Rfl:       [employer]    I,:  Isael Posada am acting as a scribe while in the presence of the attending physician :       I,:  Arcenio Hernandez MD personally performed the services described in this documentation    as scribed in my presence :

## 2021-04-29 ENCOUNTER — APPOINTMENT (OUTPATIENT)
Dept: PHYSICAL THERAPY | Facility: CLINIC | Age: 56
End: 2021-04-29
Payer: COMMERCIAL

## 2021-05-03 ENCOUNTER — OFFICE VISIT (OUTPATIENT)
Dept: PHYSICAL THERAPY | Facility: CLINIC | Age: 56
End: 2021-05-03
Payer: COMMERCIAL

## 2021-05-03 DIAGNOSIS — M51.36 DDD (DEGENERATIVE DISC DISEASE), LUMBAR: Primary | ICD-10-CM

## 2021-05-03 PROCEDURE — 97110 THERAPEUTIC EXERCISES: CPT

## 2021-05-03 NOTE — PROGRESS NOTES
Daily Note     Today's date: 5/3/2021  Patient name: Candace Barragan  : 1965  MRN: [id number]  Referring provider: Braulio Holly MD  Dx:   Encounter Diagnosis     ICD-10-CM    1  DDD (degenerative disc disease), lumbar  M51 36        Start Time: 3323  Stop Time: 1215  Total time in clinic (min): 50 minutes    Subjective: Pt reports she is feeling a little better, states she continues to have stiffness in the LB  Objective: See treatment diary below      Assessment: Pt does well w continued progression of core and functional activities  Pt continues to work towards goals of increased core strength and stability  Patient demonstrated fatigue post treatment and would benefit from continued PT  Continue to progress as able  Plan: Continue per plan of care  Progress treatment as tolerated         Precautions: DDD lumbar    Date 3/18 3/25 4/1 4/8 5/3        Visit # 1 2 3 4 5        FOTO              Re-eval IE              Manuals 3/18 3/25 4/1 4/8 5/3        L Paraspinal STM SF                                                   Neuro Re-Ed 3/18 3/25 4/1  53        TA Bracing 3x10"            Bridge w SLR 10x ea 15x ea on SB 15x ea on SB 2x10 SB 2x10 SB        Bird Dog 10x  15x BTB          Multifidi Lift             SB Omi-Knife  10x  10x 10x        Pallof Press             UTR  15x  ea 8# 10x 10#          Curl Up  2x15  2x10 BMB          SB Ham Curl  2x10 2x10 2x10 2x10        Lat Walking   5x10'  Blue X band 5x10'  Blue X band 5x10'  Blue X band                     Ther Ex 3/18 3/25 4/1          Bike  5' 5' 5' 8'        Plank 30"            Side Plank 30" ea  2x30" 2x10 on knees w clam RTB 2x10 on knees w clam RTB        Leg Press   SL 2x15 75#                                                 Pt Edu SF SF           Ther Activity             Squat  2x10 18# KB 2x10 26# KB 2x10 SL squat on hi-lo 2x10 SL squat on hi-lo        Sl RDLs  15x ea w stick 15x ea w 9# KB 15x ea w 18# KB 15x ea w 18# KB        Lunges  15x ea  10x w BMB and trunk rot 10x w BMB and trunk rot                                  Gait Training                                       Modalities

## 2021-05-06 ENCOUNTER — OFFICE VISIT (OUTPATIENT)
Dept: PHYSICAL THERAPY | Facility: CLINIC | Age: 56
End: 2021-05-06
Payer: COMMERCIAL

## 2021-05-06 DIAGNOSIS — M51.36 DDD (DEGENERATIVE DISC DISEASE), LUMBAR: Primary | ICD-10-CM

## 2021-05-06 PROCEDURE — 97110 THERAPEUTIC EXERCISES: CPT | Performed by: PHYSICAL THERAPIST

## 2021-05-06 NOTE — PROGRESS NOTES
Daily Note     Today's date: 2021  Patient name: Rodolfo Jacobs  : 1965  MRN: [id number]  Referring provider: Flor Vela MD  Dx:   Encounter Diagnosis     ICD-10-CM    1  DDD (degenerative disc disease), lumbar  M51 36                   Subjective: Pt reports her back is doing okay and she is sore after her visit this week and       Objective: See treatment diary below      Assessment: Pt is challenged w progression of core and LE strengthening activities  She does well w addition of woodchops, but needs frequent cues w pallof press  She has some back pain w UTR on R and does well w R oblique activation w curl ups in L s/l  Patient demonstrated fatigue post treatment and would benefit from continued PT  Plan: Continue per plan of care  Progress treatment as tolerated         Precautions: DDD lumbar    Date 3/18 3/25 4/1 4/8 5/3 5/6       Visit # 1 2 3 4 5 6       FOTO 59      59/68       Re-eval IE              Manuals 3/18 3/25 4/1 4/8 5/3 5/6       L Paraspinal STM SF                                                   Neuro Re-Ed 3/18 3/25 4/1  5/3 5/6       TA Bracing 3x10"            Bridge w SLR 10x ea 15x ea on SB 15x ea on SB 2x10 SB 2x10 SB 2x10 SB       Bird Dog 10x  15x BTB          Multifidi Lift             SB Omi-Knife  10x  10x 10x        Pallof Press             UTR  15x  ea 8# 10x 10#   10x 10#       Curl Up  2x15  2x10 BMB   2x10 BMB      2x10 L s/l BMB       SB Ham Curl  2x10 2x10 2x10 2x10 2x10       Lat Walking   5x10'  Blue X band 5x10'  Blue X band 5x10'  Blue X band                                  Ther Ex 3/18 3/25 4/1          Bike  5' 5' 5' 8' 5'       Plank 30"            Side Plank 30" ea  2x30" 2x10 on knees w clam RTB 2x10 on knees w clam RTB 2x10 on knees w clam       Leg Press   SL 2x15 75#          Pallof Press      2x10 w side steps 10#       Woodchops      15x ea 10#                    Pt Edu SF SF           Ther Activity             Squat  2x10 18# KB 2x10 26# KB 2x10 SL squat on hi-lo 2x10 SL squat on hi-lo        Sl RDLs  15x ea w stick 15x ea w 9# KB 15x ea w 18# KB 15x ea w 18# KB        Lunges  15x ea  10x w BMB and trunk rot 10x w BMB and trunk rot                                  Gait Training                                       Modalities

## 2021-05-13 ENCOUNTER — OFFICE VISIT (OUTPATIENT)
Dept: PHYSICAL THERAPY | Facility: CLINIC | Age: 56
End: 2021-05-13
Payer: COMMERCIAL

## 2021-05-13 DIAGNOSIS — M51.36 DDD (DEGENERATIVE DISC DISEASE), LUMBAR: Primary | ICD-10-CM

## 2021-05-13 PROCEDURE — 97110 THERAPEUTIC EXERCISES: CPT

## 2021-05-13 NOTE — PROGRESS NOTES
Daily Note     Today's date: 2021  Patient name: Randell Rosen  : 1965  MRN: [id number]  Referring provider: Mckenna Oconnor MD  Dx:   Encounter Diagnosis     ICD-10-CM    1  DDD (degenerative disc disease), lumbar  M51 36        Start Time: 953  Stop Time: 1000  Total time in clinic (min): 50 minutes    Subjective: Pt reports her back is doing okay states she has been noticing some increased fatigue overall  Objective: See treatment diary below      Assessment: Pt is challenged w progression of core and LE strengthening activities  Pt continues to show challenge with current exercise program  Patient demonstrated fatigue post treatment and would benefit from continued PT  Continue to progress as able  Plan: Continue per plan of care  Progress treatment as tolerated         Precautions: DDD lumbar    Date 3/18 3/25 4/1 4/8 5/3 5/6 5/13      Visit # 1 2 3 4 5 6 7      FOTO        Re-eval IE              Manuals 3/18 3/25 4/1 4/8 5/3 5/6 5/13      L Paraspinal STM SF                                                   Neuro Re-Ed 3/18 3/25 4/1  5/3 5/6 5/13      TA Bracing 3x10"            Bridge w SLR 10x ea 15x ea on SB 15x ea on SB 2x10 SB 2x10 SB 2x10 SB 2x10 SB      Bird Dog 10x  15x BTB          Multifidi Lift             SB Omi-Knife  10x  10x 10x        Pallof Press             UTR  15x  ea 8# 10x 10#   10x 10# 10x 10#      Curl Up  2x15  2x10 BMB   2x10 BMB      2x10 L s/l BMB 2x10 BMB      2x10 L s/l BMB      SB Ham Curl  2x10 2x10 2x10 2x10 2x10 2x10      Lat Walking   5x10'  Blue X band 5x10'  Blue X band 5x10'  Blue X band                                  Ther Ex 3/18 3/25 4/1    5/13      Bike  5' 5' 5' 8' 5' 8'      Plank 30"            Side Plank 30" ea  2x30" 2x10 on knees w clam RTB 2x10 on knees w clam RTB 2x10 on knees w clam 2x10 on knees w clam      Leg Press   SL 2x15 75#          Pallof Press      2x10 w side steps 10# 2x10 w side steps 10# Woodchops      15x ea 10# 15x ea 14#                   Pt Edu SF SF           Ther Activity             Squat  2x10 18# KB 2x10 26# KB 2x10 SL squat on hi-lo 2x10 SL squat on hi-lo  2x10 SL squat on hi-lo      Sl RDLs  15x ea w stick 15x ea w 9# KB 15x ea w 18# KB 15x ea w 18# KB  15x ea w 18# KB      Lunges  15x ea  10x w BMB and trunk rot 10x w BMB and trunk rot  10x w BMB and trunk rot                                Gait Training                                       Modalities

## 2021-05-20 ENCOUNTER — OFFICE VISIT (OUTPATIENT)
Dept: PHYSICAL THERAPY | Facility: CLINIC | Age: 56
End: 2021-05-20
Payer: COMMERCIAL

## 2021-05-20 DIAGNOSIS — M51.36 DDD (DEGENERATIVE DISC DISEASE), LUMBAR: Primary | ICD-10-CM

## 2021-05-20 PROCEDURE — 97110 THERAPEUTIC EXERCISES: CPT | Performed by: PHYSICAL THERAPIST

## 2021-05-20 NOTE — PROGRESS NOTES
Daily Note     Today's date: 2021  Patient name: Valentine Padilla  : 1965  MRN: [id number]  Referring provider: Maria Dolores Nunez MD  Dx:   Encounter Diagnosis     ICD-10-CM    1  DDD (degenerative disc disease), lumbar  M51 36                   Subjective: Pt reports her back is feeling pretty good today  Objective: See treatment diary below      Assessment: Pt is challenged w trial of reverse woodchops, pallof press w SLS, and Y balance  She does well w planking w some soreness following  Patient demonstrated fatigue post treatment and would benefit from continued PT  Plan: Continue per plan of care  Progress treatment as tolerated         Precautions: DDD lumbar    Date 3/18 3/25 4/1 4/8 5/3 5/6 5/13 5/20     Visit # 1 2 3 4 5 6 7 8     FOTO        Re-eval IE              Manuals 3/18 3/25 4/1 4/8 5/3 5/6 5/13 5/20     L Paraspinal STM SF                                                   Neuro Re-Ed 3/18 3/25 4/1  5/3 5/6 5/13 5/20     TA Bracing 3x10"            Bridge w SLR 10x ea 15x ea on SB 15x ea on SB 2x10 SB 2x10 SB 2x10 SB 2x10 SB      Bird Dog 10x  15x BTB          Multifidi Lift             SB Omi-Knife  10x  10x 10x        Pallof Press             UTR  15x  ea 8# 10x 10#   10x 10# 10x 10#      Curl Up  2x15  2x10 BMB   2x10 BMB      2x10 L s/l BMB 2x10 BMB      2x10 L s/l BMB      SB Ham Curl  2x10 2x10 2x10 2x10 2x10 2x10      Lat Walking   5x10'  Blue X band 5x10'  Blue X band 5x10'  Blue X band        Y Balance        10x ea                  Ther Ex 3/18 3/25 4/1    5/13 5/20     Bike  5' 5' 5' 8' 5' 8' 6'     Plank 30"       2x30" w hip ext     Side Plank 30" ea  2x30" 2x10 on knees w clam RTB 2x10 on knees w clam RTB 2x10 on knees w clam 2x10 on knees w clam 10x ea w s/l ABD     Leg Press   SL 2x15 75#     2x15 85#     Pallof Press      2x10 w side steps 10# 2x10 w side steps 10# 10x10" 5# w SLS     Woodchops      15x ea 10# 15x ea 14# 15x rev 15# Pt Edu SF SF           Ther Activity             Squat  2x10 18# KB 2x10 26# KB 2x10 SL squat on hi-lo 2x10 SL squat on hi-lo  2x10 SL squat on hi-lo      Sl RDLs  15x ea w stick 15x ea w 9# KB 15x ea w 18# KB 15x ea w 18# KB  15x ea w 18# KB      Lunges  15x ea  10x w BMB and trunk rot 10x w BMB and trunk rot  10x w BMB and trunk rot                                Gait Training                                       Modalities

## 2021-05-27 ENCOUNTER — OFFICE VISIT (OUTPATIENT)
Dept: PHYSICAL THERAPY | Facility: CLINIC | Age: 56
End: 2021-05-27
Payer: COMMERCIAL

## 2021-05-27 DIAGNOSIS — M51.36 DDD (DEGENERATIVE DISC DISEASE), LUMBAR: Primary | ICD-10-CM

## 2021-05-27 PROCEDURE — 97110 THERAPEUTIC EXERCISES: CPT | Performed by: PHYSICAL THERAPIST

## 2021-05-27 NOTE — PROGRESS NOTES
Daily Note     Today's date: 2021  Patient name: Lidia Tello  : 1965  MRN: [id number]  Referring provider: Neva Severe, MD  Dx:   Encounter Diagnosis     ICD-10-CM    1  DDD (degenerative disc disease), lumbar  M51 36                   Subjective: Pt reports her back is bothering her today and is painful  Objective: See treatment diary below      Assessment: Pt is challenged appropriately w continued core and trunk strengthening program   She fatigues w SB ernesto-knives, bird dogs, and mountain climbers  Patient demonstrated fatigue post treatment and would benefit from continued PT  Plan: Continue per plan of care  Progress treatment as tolerated         Precautions: DDD lumbar    Date 3/18 3/25 4/1 4/8 5/3 5/6 5/13 5/20 5/27    Visit # 1 2 3 4 5 6 7 8 9    FOTO        Re-eval IE              Manuals 3/18 3/25 4/1 4/8 5/3 5/6 5/13 5/20 5/27    L Paraspinal STM SF                                                   Neuro Re-Ed 3/18 3/25 4/1  5/3 5/6 5/13 5/20 5/27    TA Bracing 3x10"            Bridge w SLR 10x ea 15x ea on SB 15x ea on SB 2x10 SB 2x10 SB 2x10 SB 2x10 SB  2x10 SB    Bird Dog 10x  15x BTB      20x BTB    Multifidi Lift             SB Ernesto-Knife  10x  10x 10x    2x10    Pallof Press             UTR  15x  ea 8# 10x 10#   10x 10# 10x 10#      Curl Up  2x15  2x10 BMB   2x10 BMB      2x10 L s/l BMB 2x10 BMB      2x10 L s/l BMB  20x PMB    SB Ham Curl  2x10 2x10 2x10 2x10 2x10 2x10  2x10    Lat Walking   5x10'  Blue X band 5x10'  Blue X band 5x10'  Blue X band        Y Balance        10x ea                  Ther Ex 3/18 3/25 4/1    5/13 5/20 5/27    Bike  5' 5' 5' 8' 5' 8' 6' 5'    Plank 30"       2x30" w hip ext     Side Plank 30" ea  2x30" 2x10 on knees w clam RTB 2x10 on knees w clam RTB 2x10 on knees w clam 2x10 on knees w clam 10x ea w s/l ABD 2x10 w clam BTB    Leg Press   SL 2x15 75#     2x15 85#     Pallof Press      2x10 w side steps 10# 2x10 w side steps 10# 10x10" 5# w SLS     Woodchops      15x ea 10# 15x ea 14# 15x rev 15# 2x10 rev 15#    Mountain Climbers         2x30"    Pt Edu SF SF           Ther Activity             Squat  2x10 18# KB 2x10 26# KB 2x10 SL squat on hi-lo 2x10 SL squat on hi-lo  2x10 SL squat on hi-lo      Sl RDLs  15x ea w stick 15x ea w 9# KB 15x ea w 18# KB 15x ea w 18# KB  15x ea w 18# KB      Lunges  15x ea  10x w BMB and trunk rot 10x w BMB and trunk rot  10x w BMB and trunk rot                                Gait Training                                       Modalities

## 2022-02-15 ENCOUNTER — TELEPHONE (OUTPATIENT)
Dept: OBGYN CLINIC | Facility: HOSPITAL | Age: 57
End: 2022-02-15

## 2022-02-15 NOTE — TELEPHONE ENCOUNTER
I received a Care Request from patient to schedule for:    Where Does it Hurt? lower back  Are you considering joint replacement? No   Are you seeking a second opinion? No  If yes, who is your doctor? I lvm to cb to schedule

## 2022-03-03 ENCOUNTER — OFFICE VISIT (OUTPATIENT)
Dept: OBGYN CLINIC | Facility: MEDICAL CENTER | Age: 57
End: 2022-03-03
Payer: COMMERCIAL

## 2022-03-03 VITALS
DIASTOLIC BLOOD PRESSURE: 70 MMHG | WEIGHT: 153 LBS | HEIGHT: 64 IN | SYSTOLIC BLOOD PRESSURE: 120 MMHG | BODY MASS INDEX: 26.12 KG/M2 | HEART RATE: 77 BPM

## 2022-03-03 DIAGNOSIS — M51.26 BULGING LUMBAR DISC: ICD-10-CM

## 2022-03-03 DIAGNOSIS — G89.29 CHRONIC BILATERAL LOW BACK PAIN WITHOUT SCIATICA: Primary | ICD-10-CM

## 2022-03-03 DIAGNOSIS — M51.36 DDD (DEGENERATIVE DISC DISEASE), LUMBAR: ICD-10-CM

## 2022-03-03 DIAGNOSIS — M47.816 FACET ARTHROPATHY, LUMBAR: ICD-10-CM

## 2022-03-03 DIAGNOSIS — M48.061 NEURAL FORAMINAL STENOSIS OF LUMBAR SPINE: ICD-10-CM

## 2022-03-03 DIAGNOSIS — M54.50 CHRONIC BILATERAL LOW BACK PAIN WITHOUT SCIATICA: Primary | ICD-10-CM

## 2022-03-03 PROCEDURE — 99214 OFFICE O/P EST MOD 30 MIN: CPT | Performed by: EMERGENCY MEDICINE

## 2022-03-03 RX ORDER — METHYLPREDNISOLONE 4 MG/1
TABLET ORAL
Qty: 1 EACH | Refills: 0 | Status: SHIPPED | OUTPATIENT
Start: 2022-03-03

## 2022-03-03 NOTE — PROGRESS NOTES
Assessment/Plan:    Diagnoses and all orders for this visit:    Chronic bilateral low back pain without sciatica  -     Ambulatory Referral to Pain Management; Future  -     methylPREDNISolone 4 MG tablet therapy pack; Use as directed on package    Bulging lumbar disc  -     Ambulatory Referral to Pain Management; Future  -     methylPREDNISolone 4 MG tablet therapy pack; Use as directed on package    Facet arthropathy, lumbar  -     Ambulatory Referral to Pain Management; Future  -     methylPREDNISolone 4 MG tablet therapy pack; Use as directed on package    Neural foraminal stenosis of lumbar spine  -     Ambulatory Referral to Pain Management; Future  -     methylPREDNISolone 4 MG tablet therapy pack; Use as directed on package    DDD (degenerative disc disease), lumbar  -     Ambulatory Referral to Pain Management; Future  -     methylPREDNISolone 4 MG tablet therapy pack; Use as directed on package        Return if symptoms worsen or fail to improve  Chief Complaint:     Chief Complaint   Patient presents with    Spine - Pain       Subjective:   Patient ID: Celio Contreras is a 55 y o  female  NP presents for chronic lower back and years of right hip pain  Also N/T Right foot since December which has progressed into lower leg, denies weakness  She has participated in PT in the past, doing HEP and Pilates, has taken       Review of Systems    The following portions of the patient's chart were reviewed and updated as appropriate:    Allergy:  No Known Allergies      Past Medical History:   Diagnosis Date    Abnormal Pap smear of cervix     11/2018; LGSIL +HRHPV; 11/2018-colpo--wnl; 11/2019-pap    Anxiety and depression     Depression in 2015, anxiety prior     Fibroadenoma of left breast     Hypercholesteremia     Polycythemia     BMB benign     Varicella without complication        Past Surgical History:   Procedure Laterality Date    AUGMENTATION BREAST      BLEPHAROPLASTY      Ptosis     BONE MARROW BIOPSY  05/2014    Benign     COLONOSCOPY N/A 11/30/2018    Procedure: COLONOSCOPY;  Surgeon: Janet Lao MD;  Location: [facility];   Service: Gastroenterology    DILATION AND CURETTAGE OF UTERUS      SAB 47 yo    FACELIFT  03/2018    HAND SURGERY      LAPAROSCOPIC SUPRACERVICAL HYSTERECTOMY      uterine fibroid    MAMMO STEREOTACTIC BREAST BIOPSY LEFT (ALL INC)  08/2014    fibroadenoma    MOUTH SURGERY      VAGINOPLASTY         Social History     Socioeconomic History    Marital status:      Spouse name: Not on file    Number of children: Not on file    Years of education: Not on file    Highest education level: Not on file   Occupational History    Not on file   Tobacco Use    Smoking status: Current Every Day Smoker     Types: E-Cigarettes    Smokeless tobacco: Never Used   Vaping Use    Vaping Use: Never used   Substance and Sexual Activity    Alcohol use: Yes     Comment: 2-3 a week    Drug use: Not Currently     Types: Marijuana    Sexual activity: Yes     Partners: Male     Birth control/protection: Surgical   Other Topics Concern    Not on file   Social History Narrative    Denied: History of drug use - As per [employer]    Former smoker: 1/2 ppd, [age] yo, using e-cig 0 5 mg  Nicotine - As per [address]      Social Determinants of Health     Financial Resource Strain: Not on file   Food Insecurity: Not on file   Transportation Needs: Not on file   Physical Activity: Not on file   Stress: Not on file   Social Connections: Not on file   Intimate Partner Violence: Not on file   Housing Stability: Not on file       Family History   Problem Relation Age of Onset    Deep vein thrombosis Mother     Hypertension Mother    [place] Migraines Mother     Lung cancer Father     Hyperlipidemia Sister     Dementia Maternal Grandmother     Stroke Maternal Grandfather     Stroke Paternal Grandfather     Breast cancer Neg Hx     Colon cancer Neg Hx  Ovarian cancer Neg Hx     Uterine cancer Neg Hx        Medications:    Current Outpatient Medications:     atorvastatin (LIPITOR) 40 mg tablet, , Disp: , Rfl:     BIOTIN PO, Take by mouth daily, Disp: , Rfl:     Cholecalciferol (VITAMIN D PO), Take by mouth , Disp: , Rfl:     ESTROGENS CONJ SYNTHETIC B PO, Take 1 tablet by mouth daily, Disp: , Rfl:     ezetimibe (ZETIA) 10 mg tablet, Take 10 mg by mouth, Disp: , Rfl:     multivitamin (THERAGRAN) TABS, Take 1 tablet by mouth daily, Disp: , Rfl:     rizatriptan (MAXALT) 10 MG tablet, , Disp: , Rfl:     LORazepam (ATIVAN) 0 5 mg tablet, , Disp: , Rfl:     methylPREDNISolone 4 MG tablet therapy pack, Use as directed on package, Disp: 1 each, Rfl: 0    Patient Active Problem List   Diagnosis    Hyperlipidemia    Depression    Allergic rhinitis due to pollen    Encounter for screening colonoscopy    LGSIL on Pap smear of cervix    High risk HPV infection    Abnormal Pap smear of cervix    DDD (degenerative disc disease), lumbar       Objective:  /70   Pulse 77   Ht 5' 4" (1 626 m)   Wt 69 4 kg (153 lb)   LMP  (LMP Unknown)   BMI 26 26 kg/m²     Back Exam     Muscle Strength   The patient has normal back strength  Reflexes   Patellar: normal  Achilles: normal    Other   Sensation: decreased (right foot)  Gait: normal     Comments:  Neg Clonus b/l  Right leg strength 5/5 diffusely            Physical Exam      Neurologic Exam    Procedures    I have personally reviewed the written report of the pertinent studies  MRI LUMBAR SPINE WO CONTRAST    Anatomical Region Laterality Modality   L-spine -- Magnetic Resonance   MRSPINE -- --       Impression  Performed by RADIOLOGY  Impression:   1  No spinal stenosis in the lumbar region  2  Neural foraminal narrowing at L5-S1, with abutment on the left greater than   right exiting L5 nerve roots  3  Moderate neural foraminal narrowing on the right at L4-L5 as well         LUMBAR SPINE     INDICATION:   M25 [id number]: Pain in unspecified hip      COMPARISON:  None     VIEWS:  XR SPINE LUMBAR 2 OR 3 VIEWS INJURY  Images: 2     FINDINGS:     There are 5 non rib bearing lumbar vertebral bodies       There is no evidence of acute fracture or destructive osseous lesion      Mild rotatory levoscoliosis is noted  No vertebral body subluxation      Mild degenerative disc disease at L3-L4 and L5-S1  Facet arthropathy L4-L5 and L5-S1  Mild degenerative disease lower thoracic spine      The pedicles appear intact      Soft tissues are unremarkable      IMPRESSION:  No acute osseous abnormality        Degenerative changes as described

## 2022-03-03 NOTE — PATIENT INSTRUCTIONS
While taking the oral steroid Medrol Dose Pack, do not take any NSAIDs such as Advil, Motrin, ibuprofen, Motrin, Aleve or naproxen  You can restart the NSAIDs after you finish the steroids  However you may take Tylenol with these medications  While taking oral steroids, you may experience mild side effects such as feeling jittery or flushing  Please call if your side effects are significant or you have any questions  You may use Advil (ibuprofen) 600mg every 6 hours OR Aleve (naproxen) 250-500mg every 12 hours as needed for pain and inflammation  You may also take Tylenol 500mg every 4-6 hours as needed OR max 1,000mg per dose up to 3 times per day for a total of 3,000mg per day  Check with your primary care physician to see if these medications are safe to take and to make sure they do not interfere with your other medications and medical issues

## 2022-04-25 ENCOUNTER — TELEPHONE (OUTPATIENT)
Dept: PAIN MEDICINE | Facility: MEDICAL CENTER | Age: 57
End: 2022-04-25

## 2022-04-25 NOTE — TELEPHONE ENCOUNTER
Left a message asking pt to call the office if she would like to reschedule her appointment she missed on 4/22 with Dr Janine Esquivel

## (undated) DEVICE — ENDOCUFF VISION MED BLUE ID 11.0